# Patient Record
Sex: FEMALE | Race: OTHER | HISPANIC OR LATINO | URBAN - METROPOLITAN AREA
[De-identification: names, ages, dates, MRNs, and addresses within clinical notes are randomized per-mention and may not be internally consistent; named-entity substitution may affect disease eponyms.]

---

## 2017-06-24 ENCOUNTER — EMERGENCY (EMERGENCY)
Facility: HOSPITAL | Age: 62
LOS: 1 days | Discharge: HOME | End: 2017-06-24
Attending: EMERGENCY MEDICINE | Admitting: EMERGENCY MEDICINE
Payer: COMMERCIAL

## 2017-06-24 VITALS
OXYGEN SATURATION: 98 % | SYSTOLIC BLOOD PRESSURE: 131 MMHG | DIASTOLIC BLOOD PRESSURE: 69 MMHG | HEART RATE: 66 BPM | TEMPERATURE: 98 F | RESPIRATION RATE: 18 BRPM

## 2017-06-24 VITALS
RESPIRATION RATE: 18 BRPM | SYSTOLIC BLOOD PRESSURE: 162 MMHG | DIASTOLIC BLOOD PRESSURE: 90 MMHG | TEMPERATURE: 98 F | WEIGHT: 153 LBS | OXYGEN SATURATION: 97 % | HEIGHT: 59 IN | HEART RATE: 81 BPM

## 2017-06-24 DIAGNOSIS — R10.10 UPPER ABDOMINAL PAIN, UNSPECIFIED: ICD-10-CM

## 2017-06-24 DIAGNOSIS — R10.13 EPIGASTRIC PAIN: ICD-10-CM

## 2017-06-24 DIAGNOSIS — E03.9 HYPOTHYROIDISM, UNSPECIFIED: ICD-10-CM

## 2017-06-24 DIAGNOSIS — Z79.899 OTHER LONG TERM (CURRENT) DRUG THERAPY: ICD-10-CM

## 2017-06-24 DIAGNOSIS — J45.909 UNSPECIFIED ASTHMA, UNCOMPLICATED: ICD-10-CM

## 2017-06-24 LAB
ALBUMIN SERPL ELPH-MCNC: 4.5 G/DL — SIGNIFICANT CHANGE UP (ref 3.3–5)
ALP SERPL-CCNC: 90 U/L — SIGNIFICANT CHANGE UP (ref 40–120)
ALT FLD-CCNC: 11 U/L — SIGNIFICANT CHANGE UP (ref 10–45)
ANION GAP SERPL CALC-SCNC: 15 MMOL/L — SIGNIFICANT CHANGE UP (ref 5–17)
AST SERPL-CCNC: 18 U/L — SIGNIFICANT CHANGE UP (ref 10–40)
BASOPHILS NFR BLD AUTO: 0.9 % — SIGNIFICANT CHANGE UP (ref 0–2)
BILIRUB SERPL-MCNC: 0.2 MG/DL — SIGNIFICANT CHANGE UP (ref 0.2–1.2)
BUN SERPL-MCNC: 22 MG/DL — SIGNIFICANT CHANGE UP (ref 7–23)
CALCIUM SERPL-MCNC: 9.6 MG/DL — SIGNIFICANT CHANGE UP (ref 8.4–10.5)
CHLORIDE SERPL-SCNC: 103 MMOL/L — SIGNIFICANT CHANGE UP (ref 96–108)
CO2 SERPL-SCNC: 24 MMOL/L — SIGNIFICANT CHANGE UP (ref 22–31)
CREAT SERPL-MCNC: 0.7 MG/DL — SIGNIFICANT CHANGE UP (ref 0.5–1.3)
EOSINOPHIL NFR BLD AUTO: 2.3 % — SIGNIFICANT CHANGE UP (ref 0–6)
GLUCOSE SERPL-MCNC: 111 MG/DL — HIGH (ref 70–99)
HCT VFR BLD CALC: 38.3 % — SIGNIFICANT CHANGE UP (ref 34.5–45)
HGB BLD-MCNC: 12.9 G/DL — SIGNIFICANT CHANGE UP (ref 11.5–15.5)
LIDOCAIN IGE QN: 60 U/L — SIGNIFICANT CHANGE UP (ref 7–60)
LYMPHOCYTES # BLD AUTO: 32 % — SIGNIFICANT CHANGE UP (ref 13–44)
MCHC RBC-ENTMCNC: 28.5 PG — SIGNIFICANT CHANGE UP (ref 27–34)
MCHC RBC-ENTMCNC: 33.7 G/DL — SIGNIFICANT CHANGE UP (ref 32–36)
MCV RBC AUTO: 84.7 FL — SIGNIFICANT CHANGE UP (ref 80–100)
MONOCYTES NFR BLD AUTO: 6.6 % — SIGNIFICANT CHANGE UP (ref 2–14)
NEUTROPHILS NFR BLD AUTO: 58.2 % — SIGNIFICANT CHANGE UP (ref 43–77)
PLATELET # BLD AUTO: 319 K/UL — SIGNIFICANT CHANGE UP (ref 150–400)
POTASSIUM SERPL-MCNC: 4.1 MMOL/L — SIGNIFICANT CHANGE UP (ref 3.5–5.3)
POTASSIUM SERPL-SCNC: 4.1 MMOL/L — SIGNIFICANT CHANGE UP (ref 3.5–5.3)
PROT SERPL-MCNC: 7.7 G/DL — SIGNIFICANT CHANGE UP (ref 6–8.3)
RBC # BLD: 4.52 M/UL — SIGNIFICANT CHANGE UP (ref 3.8–5.2)
RBC # FLD: 12.9 % — SIGNIFICANT CHANGE UP (ref 10.3–16.9)
SODIUM SERPL-SCNC: 142 MMOL/L — SIGNIFICANT CHANGE UP (ref 135–145)
WBC # BLD: 7.7 K/UL — SIGNIFICANT CHANGE UP (ref 3.8–10.5)
WBC # FLD AUTO: 7.7 K/UL — SIGNIFICANT CHANGE UP (ref 3.8–10.5)

## 2017-06-24 PROCEDURE — 83690 ASSAY OF LIPASE: CPT

## 2017-06-24 PROCEDURE — 93010 ELECTROCARDIOGRAM REPORT: CPT | Mod: NC

## 2017-06-24 PROCEDURE — 99284 EMERGENCY DEPT VISIT MOD MDM: CPT | Mod: 25

## 2017-06-24 PROCEDURE — 93005 ELECTROCARDIOGRAM TRACING: CPT

## 2017-06-24 PROCEDURE — 96375 TX/PRO/DX INJ NEW DRUG ADDON: CPT

## 2017-06-24 PROCEDURE — 80053 COMPREHEN METABOLIC PANEL: CPT

## 2017-06-24 PROCEDURE — 99285 EMERGENCY DEPT VISIT HI MDM: CPT | Mod: 25

## 2017-06-24 PROCEDURE — 85025 COMPLETE CBC W/AUTO DIFF WBC: CPT

## 2017-06-24 PROCEDURE — 96374 THER/PROPH/DIAG INJ IV PUSH: CPT

## 2017-06-24 RX ORDER — SODIUM CHLORIDE 9 MG/ML
3 INJECTION INTRAMUSCULAR; INTRAVENOUS; SUBCUTANEOUS ONCE
Qty: 0 | Refills: 0 | Status: COMPLETED | OUTPATIENT
Start: 2017-06-24 | End: 2017-06-24

## 2017-06-24 RX ORDER — FAMOTIDINE 10 MG/ML
20 INJECTION INTRAVENOUS ONCE
Qty: 0 | Refills: 0 | Status: COMPLETED | OUTPATIENT
Start: 2017-06-24 | End: 2017-06-24

## 2017-06-24 RX ORDER — ONDANSETRON 8 MG/1
4 TABLET, FILM COATED ORAL ONCE
Qty: 0 | Refills: 0 | Status: COMPLETED | OUTPATIENT
Start: 2017-06-24 | End: 2017-06-24

## 2017-06-24 RX ORDER — LIDOCAINE 4 G/100G
10 CREAM TOPICAL ONCE
Qty: 0 | Refills: 0 | Status: COMPLETED | OUTPATIENT
Start: 2017-06-24 | End: 2017-06-24

## 2017-06-24 RX ORDER — FAMOTIDINE 10 MG/ML
1 INJECTION INTRAVENOUS
Qty: 14 | Refills: 0 | OUTPATIENT
Start: 2017-06-24 | End: 2017-07-01

## 2017-06-24 RX ADMIN — SODIUM CHLORIDE 3 MILLILITER(S): 9 INJECTION INTRAMUSCULAR; INTRAVENOUS; SUBCUTANEOUS at 17:55

## 2017-06-24 RX ADMIN — Medication 30 MILLILITER(S): at 17:52

## 2017-06-24 RX ADMIN — ONDANSETRON 4 MILLIGRAM(S): 8 TABLET, FILM COATED ORAL at 17:52

## 2017-06-24 RX ADMIN — FAMOTIDINE 20 MILLIGRAM(S): 10 INJECTION INTRAVENOUS at 17:53

## 2017-06-24 RX ADMIN — LIDOCAINE 10 MILLILITER(S): 4 CREAM TOPICAL at 17:52

## 2017-06-24 NOTE — ED ADULT NURSE NOTE - PMH
Asthma  Asthma  Cholelithiasis  Gallstones  Gastroesophageal reflux disease  GERD (gastroesophageal reflux disease)  Hypothyroidism  Hypothyroid

## 2017-06-24 NOTE — ED PROVIDER NOTE - OBJECTIVE STATEMENT
The pt is a 63 y/o F, who presents to ED c/o upper abd pain since this am. Pt states pain started 30 min after eating breakfast - had scrambled eggs and fries, pain to mid upper abd, 6/10, non radiating, has not taken any meds. no PSH. Denies n/v/d, cp, sob, fevers, chills.

## 2017-06-24 NOTE — ED ADULT NURSE NOTE - CHPI ED SYMPTOMS NEG
no diarrhea/no vomiting/no burning urination/no blood in stool/no hematuria/no fever/no dysuria/no chills/no abdominal distension/no nausea

## 2017-06-24 NOTE — ED ADULT NURSE NOTE - OBJECTIVE STATEMENT
Pt states she had a argument with her  and was feeling very stressed and after she ate she felt worse. Pt states she takes ranitidine for her chronic bronchitis and ulcers.

## 2017-06-24 NOTE — ED PROVIDER NOTE - ATTENDING CONTRIBUTION TO CARE
pt seen and examined by me, agree with above. 63 yo female with upper abd pain for 1 day.  hx of similar in past, is already on acid blocker, sees dr mcgraw GI.  on exam, appears comfortable, heart and lungs normal, abd mildly tender on epigastrum, no rebound or guarding.  feels better after treatment, labs reviewed.  dr mcgraw contacted for fu

## 2017-10-29 ENCOUNTER — INPATIENT (INPATIENT)
Facility: HOSPITAL | Age: 62
LOS: 0 days | Discharge: HOME | DRG: 446 | End: 2017-10-30
Attending: SPECIALIST | Admitting: SPECIALIST
Payer: COMMERCIAL

## 2017-10-29 VITALS
WEIGHT: 110.01 LBS | HEART RATE: 97 BPM | DIASTOLIC BLOOD PRESSURE: 73 MMHG | TEMPERATURE: 98 F | OXYGEN SATURATION: 96 % | SYSTOLIC BLOOD PRESSURE: 158 MMHG | RESPIRATION RATE: 18 BRPM

## 2017-10-29 LAB
ALBUMIN SERPL ELPH-MCNC: 4.6 G/DL — SIGNIFICANT CHANGE UP (ref 3.3–5)
ALP SERPL-CCNC: 94 U/L — SIGNIFICANT CHANGE UP (ref 40–120)
ALT FLD-CCNC: 10 U/L — SIGNIFICANT CHANGE UP (ref 10–45)
ANION GAP SERPL CALC-SCNC: 15 MMOL/L — SIGNIFICANT CHANGE UP (ref 5–17)
APPEARANCE UR: CLEAR — SIGNIFICANT CHANGE UP
APTT BLD: 31.1 SEC — SIGNIFICANT CHANGE UP (ref 27.5–37.4)
AST SERPL-CCNC: 16 U/L — SIGNIFICANT CHANGE UP (ref 10–40)
BASOPHILS NFR BLD AUTO: 1 % — SIGNIFICANT CHANGE UP (ref 0–2)
BILIRUB SERPL-MCNC: 0.2 MG/DL — SIGNIFICANT CHANGE UP (ref 0.2–1.2)
BILIRUB UR-MCNC: NEGATIVE — SIGNIFICANT CHANGE UP
BUN SERPL-MCNC: 14 MG/DL — SIGNIFICANT CHANGE UP (ref 7–23)
CALCIUM SERPL-MCNC: 9.8 MG/DL — SIGNIFICANT CHANGE UP (ref 8.4–10.5)
CHLORIDE SERPL-SCNC: 99 MMOL/L — SIGNIFICANT CHANGE UP (ref 96–108)
CO2 SERPL-SCNC: 24 MMOL/L — SIGNIFICANT CHANGE UP (ref 22–31)
COLOR SPEC: YELLOW — SIGNIFICANT CHANGE UP
CREAT SERPL-MCNC: 0.73 MG/DL — SIGNIFICANT CHANGE UP (ref 0.5–1.3)
DIFF PNL FLD: NEGATIVE — SIGNIFICANT CHANGE UP
EOSINOPHIL NFR BLD AUTO: 3.1 % — SIGNIFICANT CHANGE UP (ref 0–6)
GLUCOSE SERPL-MCNC: 117 MG/DL — HIGH (ref 70–99)
GLUCOSE UR QL: NEGATIVE — SIGNIFICANT CHANGE UP
HCT VFR BLD CALC: 35.1 % — SIGNIFICANT CHANGE UP (ref 34.5–45)
HGB BLD-MCNC: 12.3 G/DL — SIGNIFICANT CHANGE UP (ref 11.5–15.5)
INR BLD: 0.97 — SIGNIFICANT CHANGE UP (ref 0.88–1.16)
KETONES UR-MCNC: NEGATIVE — SIGNIFICANT CHANGE UP
LEUKOCYTE ESTERASE UR-ACNC: NEGATIVE — SIGNIFICANT CHANGE UP
LIDOCAIN IGE QN: 224 U/L — HIGH (ref 7–60)
LYMPHOCYTES # BLD AUTO: 27.9 % — SIGNIFICANT CHANGE UP (ref 13–44)
MCHC RBC-ENTMCNC: 29.6 PG — SIGNIFICANT CHANGE UP (ref 27–34)
MCHC RBC-ENTMCNC: 35 G/DL — SIGNIFICANT CHANGE UP (ref 32–36)
MCV RBC AUTO: 84.4 FL — SIGNIFICANT CHANGE UP (ref 80–100)
MONOCYTES NFR BLD AUTO: 8.4 % — SIGNIFICANT CHANGE UP (ref 2–14)
NEUTROPHILS NFR BLD AUTO: 59.6 % — SIGNIFICANT CHANGE UP (ref 43–77)
NITRITE UR-MCNC: NEGATIVE — SIGNIFICANT CHANGE UP
PH UR: 6.5 — SIGNIFICANT CHANGE UP (ref 5–8)
PLATELET # BLD AUTO: 343 K/UL — SIGNIFICANT CHANGE UP (ref 150–400)
POTASSIUM SERPL-MCNC: 3.8 MMOL/L — SIGNIFICANT CHANGE UP (ref 3.5–5.3)
POTASSIUM SERPL-SCNC: 3.8 MMOL/L — SIGNIFICANT CHANGE UP (ref 3.5–5.3)
PROT SERPL-MCNC: 7.8 G/DL — SIGNIFICANT CHANGE UP (ref 6–8.3)
PROT UR-MCNC: NEGATIVE MG/DL — SIGNIFICANT CHANGE UP
PROTHROM AB SERPL-ACNC: 10.8 SEC — SIGNIFICANT CHANGE UP (ref 9.8–12.7)
RBC # BLD: 4.16 M/UL — SIGNIFICANT CHANGE UP (ref 3.8–5.2)
RBC # FLD: 12.9 % — SIGNIFICANT CHANGE UP (ref 10.3–16.9)
SODIUM SERPL-SCNC: 138 MMOL/L — SIGNIFICANT CHANGE UP (ref 135–145)
SP GR SPEC: <=1.005 — SIGNIFICANT CHANGE UP (ref 1–1.03)
UROBILINOGEN FLD QL: 0.2 E.U./DL — SIGNIFICANT CHANGE UP
WBC # BLD: 7.8 K/UL — SIGNIFICANT CHANGE UP (ref 3.8–10.5)
WBC # FLD AUTO: 7.8 K/UL — SIGNIFICANT CHANGE UP (ref 3.8–10.5)

## 2017-10-29 PROCEDURE — 74176 CT ABD & PELVIS W/O CONTRAST: CPT | Mod: 26,59

## 2017-10-29 PROCEDURE — 74177 CT ABD & PELVIS W/CONTRAST: CPT | Mod: 26

## 2017-10-29 PROCEDURE — 93010 ELECTROCARDIOGRAM REPORT: CPT

## 2017-10-29 PROCEDURE — 99285 EMERGENCY DEPT VISIT HI MDM: CPT | Mod: 25

## 2017-10-29 RX ORDER — ONDANSETRON 8 MG/1
4 TABLET, FILM COATED ORAL ONCE
Qty: 0 | Refills: 0 | Status: COMPLETED | OUTPATIENT
Start: 2017-10-29 | End: 2017-10-29

## 2017-10-29 RX ORDER — FAMOTIDINE 10 MG/ML
20 INJECTION INTRAVENOUS ONCE
Qty: 0 | Refills: 0 | Status: COMPLETED | OUTPATIENT
Start: 2017-10-29 | End: 2017-10-29

## 2017-10-29 RX ORDER — MORPHINE SULFATE 50 MG/1
4 CAPSULE, EXTENDED RELEASE ORAL ONCE
Qty: 0 | Refills: 0 | Status: DISCONTINUED | OUTPATIENT
Start: 2017-10-29 | End: 2017-10-29

## 2017-10-29 RX ORDER — SODIUM CHLORIDE 9 MG/ML
1000 INJECTION INTRAMUSCULAR; INTRAVENOUS; SUBCUTANEOUS ONCE
Qty: 0 | Refills: 0 | Status: COMPLETED | OUTPATIENT
Start: 2017-10-29 | End: 2017-10-29

## 2017-10-29 RX ORDER — IOHEXOL 300 MG/ML
50 INJECTION, SOLUTION INTRAVENOUS ONCE
Qty: 0 | Refills: 0 | Status: COMPLETED | OUTPATIENT
Start: 2017-10-29 | End: 2017-10-29

## 2017-10-29 RX ORDER — SODIUM CHLORIDE 9 MG/ML
3 INJECTION INTRAMUSCULAR; INTRAVENOUS; SUBCUTANEOUS ONCE
Qty: 0 | Refills: 0 | Status: COMPLETED | OUTPATIENT
Start: 2017-10-29 | End: 2017-10-29

## 2017-10-29 RX ADMIN — MORPHINE SULFATE 4 MILLIGRAM(S): 50 CAPSULE, EXTENDED RELEASE ORAL at 16:07

## 2017-10-29 RX ADMIN — MORPHINE SULFATE 4 MILLIGRAM(S): 50 CAPSULE, EXTENDED RELEASE ORAL at 21:06

## 2017-10-29 RX ADMIN — SODIUM CHLORIDE 1000 MILLILITER(S): 9 INJECTION INTRAMUSCULAR; INTRAVENOUS; SUBCUTANEOUS at 16:35

## 2017-10-29 RX ADMIN — ONDANSETRON 4 MILLIGRAM(S): 8 TABLET, FILM COATED ORAL at 19:13

## 2017-10-29 RX ADMIN — SODIUM CHLORIDE 3 MILLILITER(S): 9 INJECTION INTRAMUSCULAR; INTRAVENOUS; SUBCUTANEOUS at 15:23

## 2017-10-29 RX ADMIN — IOHEXOL 50 MILLILITER(S): 300 INJECTION, SOLUTION INTRAVENOUS at 16:35

## 2017-10-29 RX ADMIN — MORPHINE SULFATE 4 MILLIGRAM(S): 50 CAPSULE, EXTENDED RELEASE ORAL at 16:22

## 2017-10-29 RX ADMIN — MORPHINE SULFATE 4 MILLIGRAM(S): 50 CAPSULE, EXTENDED RELEASE ORAL at 21:22

## 2017-10-29 RX ADMIN — FAMOTIDINE 20 MILLIGRAM(S): 10 INJECTION INTRAVENOUS at 15:28

## 2017-10-29 RX ADMIN — MORPHINE SULFATE 4 MILLIGRAM(S): 50 CAPSULE, EXTENDED RELEASE ORAL at 15:28

## 2017-10-29 NOTE — CONSULT NOTE ADULT - ASSESSMENT
62F with epigastric pain most likely 2/2 PUD however symptomatic cholelithiasis remains on the differential.    - Repeat CT scan showing normal appendix, and no concern for perforated viscus.  - No emergent surgical intervention at this point in time.  - Would appreciate Dr. Tinajero's input for admission and repeat EGD for possible recurrent PUD.  - Would recommend outpt follow up with Dr. Zaid Choudhary for discussion of elective laparoscopic cholecystectomy.  - Please reconsult for further questions.  - Discussed with Chief on Call and Dr. Zaid Choudhary.

## 2017-10-29 NOTE — ED PROVIDER NOTE - PROGRESS NOTE DETAILS
Pt with continued epigastric abdominal pain. CT without demonstration of pancreatitis despite slightly elevated lipase. Pt with ? finding to proximal appendix, Surgery consulted, repeat CT performed and not felt to have acute process present to appendix which correlates with patient's clinical presentation. Pt continues to require IV morphine for pain, likely PUD as etiology, discussed with Dr. Tinajero, and will admit for continued management.

## 2017-10-29 NOTE — ED PROVIDER NOTE - MEDICAL DECISION MAKING DETAILS
61 yo female with hx of gerd, gallstones with upper abd pain.  will get labs, may need sono 63 yo female with hx of gerd, gallstones with upper abd pain.  will get labs, may need sono.  lipase was elevated ct ordered.  ct showed ?early appendicitis.  dw Dr Tinajero, pt's GI doctor, request Dr cee for surgery consult

## 2017-10-29 NOTE — CONSULT NOTE ADULT - SUBJECTIVE AND OBJECTIVE BOX
HPI: 62F with a hx of PUD and cholelithiasis presenting to the [facility] ED with an episode of epigastric pain described as a "pulling sensation" after eating a fatty meal this evening.  She reports that the pain is similar to other episodes in the past.  It has been associated with N/V, once prior to arrival to the ED and was NBNB, and once after drinking the oral contrast for the CT which was also NBNB.  She denies F/C/CP/SOB/Diarrhea/Dysuria. Of note she has been followed by Dr. Tinajero for her PUD/GERD in the past.  Last EGD was over a year ago and according to the pt showed an ulcer.  She thinks that she was treated for H pylori in the past but doesn't remember.  She currently takes omeprazole daily, PRN ranitadine, and sucralfate.  Last colonoscopy was a year ago and reportedly normal. Denies Hematochezia.  Of note the daughter reports that her appetite has been diminished for the last year with concurrent weight loss of around 50 lbs. Denies NSAID use.    PMH: PUD, GERD, Cholelithiasis, Hypothyroid  Meds: Omeprazole, Ranitadine, Sucralfate, Synthroid  NKDA  Social: Denies ETOH use or smoking hx          Vital Signs Last 24 Hrs  T(C): 36.5 (29 Oct 2017 23:25), Max: 37.5 (29 Oct 2017 16:02)  T(F): 97.7 (29 Oct 2017 23:25), Max: 99.5 (29 Oct 2017 16:02)  HR: 66 (29 Oct 2017 23:25) (66 - 97)  BP: 144/67 (29 Oct 2017 23:25) (144/67 - 158/73)  BP(mean): --  RR: 18 (29 Oct 2017 23:25) (18 - 18)  SpO2: 97% (29 Oct 2017 23:25) (96% - 99%)    PHYSICAL EXAM:  Gen: Age appropriate female in mild distress 2/2 epigastric discomfort  CV: RRR  Resp: CTAB  Abd: normoactive BS, soft, tender to palpation in the epigastrium, no ttp below the umbilicus. Nondistended  Ext: WWP, no peripheral edema      LABS:                        12.3   7.8   )-----------( 343      ( 29 Oct 2017 15:24 )             35.1     10-29    138  |  99  |  14  ----------------------------<  117<H>  3.8   |  24  |  0.73    Ca    9.8      29 Oct 2017 15:24    TPro  7.8  /  Alb  4.6  /  TBili  0.2  /  DBili  x   /  AST  16  /  ALT  10  /  AlkPhos  94  10-29    PT/INR - ( 29 Oct 2017 15:24 )   PT: 10.8 sec;   INR: 0.97          PTT - ( 29 Oct 2017 15:24 )  PTT:31.1 sec  Urinalysis Basic - ( 29 Oct 2017 21:04 )    Color: Yellow / Appearance: Clear / SG: <=1.005 / pH: x  Gluc: x / Ketone: NEGATIVE  / Bili: Negative / Urobili: 0.2 E.U./dL   Blood: x / Protein: NEGATIVE mg/dL / Nitrite: NEGATIVE   Leuk Esterase: NEGATIVE / RBC: x / WBC x   Sq Epi: x / Non Sq Epi: x / Bacteria: x        RADIOLOGY & ADDITIONAL STUDIES:  1st CT:  1. Thickening involving the origin and proximal segment of the appendix   which measures up to 10 mm, the mid and distal segments of the appendix   are normal in caliber and filled with gas. No periappendiceal fat   stranding or free fluid. Cannot entirely exclude an early evolving   appendicitis and further correlation with the patient's clinical symptoms   are recommended. Cholelithiasis with no evidence of cholecystitis.  3. No evidence of pancreatitis.    2nd CT:  No significant change in mild thickening along the proximal appendix just   distal to its origin off the base of the cecum, however there is no   surrounding fat infiltration or free fluid to suggest active inflammatory   change. The mid and distal segments of the appendix are normal in caliber   and filled with gas. These findings indicate a normal appendix.  Cholelithiasis.

## 2017-10-29 NOTE — ED ADULT NURSE NOTE - OBJECTIVE STATEMENT
patient states that she ate an omelette and home fries this morning from a restaurant and then started to have epigastric pain and tenderness. patient states "I think it may be my ulcer". denies chest pain, fever, chills, n/v.

## 2017-10-30 ENCOUNTER — TRANSCRIPTION ENCOUNTER (OUTPATIENT)
Age: 62
End: 2017-10-30

## 2017-10-30 VITALS
DIASTOLIC BLOOD PRESSURE: 78 MMHG | HEART RATE: 62 BPM | RESPIRATION RATE: 18 BRPM | SYSTOLIC BLOOD PRESSURE: 138 MMHG | TEMPERATURE: 99 F | OXYGEN SATURATION: 97 %

## 2017-10-30 DIAGNOSIS — E03.9 HYPOTHYROIDISM, UNSPECIFIED: ICD-10-CM

## 2017-10-30 DIAGNOSIS — R10.13 EPIGASTRIC PAIN: ICD-10-CM

## 2017-10-30 DIAGNOSIS — R63.8 OTHER SYMPTOMS AND SIGNS CONCERNING FOOD AND FLUID INTAKE: ICD-10-CM

## 2017-10-30 DIAGNOSIS — J45.909 UNSPECIFIED ASTHMA, UNCOMPLICATED: ICD-10-CM

## 2017-10-30 DIAGNOSIS — Z29.9 ENCOUNTER FOR PROPHYLACTIC MEASURES, UNSPECIFIED: ICD-10-CM

## 2017-10-30 DIAGNOSIS — R74.8 ABNORMAL LEVELS OF OTHER SERUM ENZYMES: ICD-10-CM

## 2017-10-30 LAB
ALBUMIN SERPL ELPH-MCNC: 4.5 G/DL — SIGNIFICANT CHANGE UP (ref 3.3–5)
ALP SERPL-CCNC: 100 U/L — SIGNIFICANT CHANGE UP (ref 40–120)
ALT FLD-CCNC: 9 U/L — LOW (ref 10–45)
ANION GAP SERPL CALC-SCNC: 16 MMOL/L — SIGNIFICANT CHANGE UP (ref 5–17)
AST SERPL-CCNC: 14 U/L — SIGNIFICANT CHANGE UP (ref 10–40)
BASOPHILS NFR BLD AUTO: 0.4 % — SIGNIFICANT CHANGE UP (ref 0–2)
BILIRUB SERPL-MCNC: 0.4 MG/DL — SIGNIFICANT CHANGE UP (ref 0.2–1.2)
BLD GP AB SCN SERPL QL: NEGATIVE — SIGNIFICANT CHANGE UP
BUN SERPL-MCNC: 17 MG/DL — SIGNIFICANT CHANGE UP (ref 7–23)
CALCIUM SERPL-MCNC: 9.5 MG/DL — SIGNIFICANT CHANGE UP (ref 8.4–10.5)
CHLORIDE SERPL-SCNC: 99 MMOL/L — SIGNIFICANT CHANGE UP (ref 96–108)
CO2 SERPL-SCNC: 23 MMOL/L — SIGNIFICANT CHANGE UP (ref 22–31)
CREAT SERPL-MCNC: 0.62 MG/DL — SIGNIFICANT CHANGE UP (ref 0.5–1.3)
GLUCOSE SERPL-MCNC: 134 MG/DL — HIGH (ref 70–99)
HBA1C BLD-MCNC: 5.9 % — HIGH (ref 4–5.6)
HCT VFR BLD CALC: 34.3 % — LOW (ref 34.5–45)
HGB BLD-MCNC: 11.5 G/DL — SIGNIFICANT CHANGE UP (ref 11.5–15.5)
INR BLD: 1.04 — SIGNIFICANT CHANGE UP (ref 0.88–1.16)
LYMPHOCYTES # BLD AUTO: 16.1 % — SIGNIFICANT CHANGE UP (ref 13–44)
MCHC RBC-ENTMCNC: 28.7 PG — SIGNIFICANT CHANGE UP (ref 27–34)
MCHC RBC-ENTMCNC: 33.5 G/DL — SIGNIFICANT CHANGE UP (ref 32–36)
MCV RBC AUTO: 85.5 FL — SIGNIFICANT CHANGE UP (ref 80–100)
MONOCYTES NFR BLD AUTO: 5.4 % — SIGNIFICANT CHANGE UP (ref 2–14)
NEUTROPHILS NFR BLD AUTO: 78.1 % — HIGH (ref 43–77)
PLATELET # BLD AUTO: 326 K/UL — SIGNIFICANT CHANGE UP (ref 150–400)
POTASSIUM SERPL-MCNC: 3.9 MMOL/L — SIGNIFICANT CHANGE UP (ref 3.5–5.3)
POTASSIUM SERPL-SCNC: 3.9 MMOL/L — SIGNIFICANT CHANGE UP (ref 3.5–5.3)
PROT SERPL-MCNC: 7.6 G/DL — SIGNIFICANT CHANGE UP (ref 6–8.3)
PROTHROM AB SERPL-ACNC: 11.5 SEC — SIGNIFICANT CHANGE UP (ref 9.8–12.7)
RBC # BLD: 4.01 M/UL — SIGNIFICANT CHANGE UP (ref 3.8–5.2)
RBC # FLD: 12.6 % — SIGNIFICANT CHANGE UP (ref 10.3–16.9)
RH IG SCN BLD-IMP: POSITIVE — SIGNIFICANT CHANGE UP
SODIUM SERPL-SCNC: 138 MMOL/L — SIGNIFICANT CHANGE UP (ref 135–145)
TSH SERPL-MCNC: 0.22 UIU/ML — LOW (ref 0.35–4.94)
WBC # BLD: 7.7 K/UL — SIGNIFICANT CHANGE UP (ref 3.8–10.5)
WBC # FLD AUTO: 7.7 K/UL — SIGNIFICANT CHANGE UP (ref 3.8–10.5)

## 2017-10-30 PROCEDURE — 81003 URINALYSIS AUTO W/O SCOPE: CPT

## 2017-10-30 PROCEDURE — 86900 BLOOD TYPING SEROLOGIC ABO: CPT

## 2017-10-30 PROCEDURE — 36415 COLL VENOUS BLD VENIPUNCTURE: CPT

## 2017-10-30 PROCEDURE — 85730 THROMBOPLASTIN TIME PARTIAL: CPT

## 2017-10-30 PROCEDURE — 93010 ELECTROCARDIOGRAM REPORT: CPT

## 2017-10-30 PROCEDURE — 93005 ELECTROCARDIOGRAM TRACING: CPT

## 2017-10-30 PROCEDURE — 74176 CT ABD & PELVIS W/O CONTRAST: CPT

## 2017-10-30 PROCEDURE — 74177 CT ABD & PELVIS W/CONTRAST: CPT

## 2017-10-30 PROCEDURE — 85610 PROTHROMBIN TIME: CPT

## 2017-10-30 PROCEDURE — 83036 HEMOGLOBIN GLYCOSYLATED A1C: CPT

## 2017-10-30 PROCEDURE — 99285 EMERGENCY DEPT VISIT HI MDM: CPT | Mod: 25

## 2017-10-30 PROCEDURE — 83690 ASSAY OF LIPASE: CPT

## 2017-10-30 PROCEDURE — 96374 THER/PROPH/DIAG INJ IV PUSH: CPT | Mod: XU

## 2017-10-30 PROCEDURE — 85025 COMPLETE CBC W/AUTO DIFF WBC: CPT

## 2017-10-30 PROCEDURE — 86850 RBC ANTIBODY SCREEN: CPT

## 2017-10-30 PROCEDURE — 86901 BLOOD TYPING SEROLOGIC RH(D): CPT

## 2017-10-30 PROCEDURE — 84443 ASSAY THYROID STIM HORMONE: CPT

## 2017-10-30 PROCEDURE — 96375 TX/PRO/DX INJ NEW DRUG ADDON: CPT

## 2017-10-30 PROCEDURE — 80053 COMPREHEN METABOLIC PANEL: CPT

## 2017-10-30 PROCEDURE — 96376 TX/PRO/DX INJ SAME DRUG ADON: CPT

## 2017-10-30 RX ORDER — LEVOTHYROXINE SODIUM 125 MCG
100 TABLET ORAL DAILY
Qty: 0 | Refills: 0 | Status: DISCONTINUED | OUTPATIENT
Start: 2017-10-30 | End: 2017-10-30

## 2017-10-30 RX ORDER — LEVOTHYROXINE SODIUM 125 MCG
0 TABLET ORAL
Qty: 0 | Refills: 0 | COMMUNITY

## 2017-10-30 RX ORDER — IPRATROPIUM/ALBUTEROL SULFATE 18-103MCG
3 AEROSOL WITH ADAPTER (GRAM) INHALATION EVERY 6 HOURS
Qty: 0 | Refills: 0 | Status: DISCONTINUED | OUTPATIENT
Start: 2017-10-30 | End: 2017-10-30

## 2017-10-30 RX ORDER — INFLUENZA VIRUS VACCINE 15; 15; 15; 15 UG/.5ML; UG/.5ML; UG/.5ML; UG/.5ML
0.5 SUSPENSION INTRAMUSCULAR ONCE
Qty: 0 | Refills: 0 | Status: DISCONTINUED | OUTPATIENT
Start: 2017-10-30 | End: 2017-10-30

## 2017-10-30 RX ORDER — PANTOPRAZOLE SODIUM 20 MG/1
40 TABLET, DELAYED RELEASE ORAL DAILY
Qty: 0 | Refills: 0 | Status: DISCONTINUED | OUTPATIENT
Start: 2017-10-30 | End: 2017-10-30

## 2017-10-30 RX ORDER — HEPARIN SODIUM 5000 [USP'U]/ML
5000 INJECTION INTRAVENOUS; SUBCUTANEOUS EVERY 8 HOURS
Qty: 0 | Refills: 0 | Status: DISCONTINUED | OUTPATIENT
Start: 2017-10-30 | End: 2017-10-30

## 2017-10-30 RX ORDER — PANTOPRAZOLE SODIUM 20 MG/1
40 TABLET, DELAYED RELEASE ORAL ONCE
Qty: 0 | Refills: 0 | Status: COMPLETED | OUTPATIENT
Start: 2017-10-30 | End: 2017-10-30

## 2017-10-30 RX ORDER — LEVOTHYROXINE SODIUM 125 MCG
1 TABLET ORAL
Qty: 0 | Refills: 0 | COMMUNITY

## 2017-10-30 RX ORDER — RANITIDINE HYDROCHLORIDE 150 MG/1
1 TABLET, FILM COATED ORAL
Qty: 0 | Refills: 0 | COMMUNITY

## 2017-10-30 RX ORDER — SODIUM CHLORIDE 9 MG/ML
1000 INJECTION INTRAMUSCULAR; INTRAVENOUS; SUBCUTANEOUS
Qty: 0 | Refills: 0 | Status: DISCONTINUED | OUTPATIENT
Start: 2017-10-30 | End: 2017-10-30

## 2017-10-30 RX ADMIN — SODIUM CHLORIDE 75 MILLILITER(S): 9 INJECTION INTRAMUSCULAR; INTRAVENOUS; SUBCUTANEOUS at 01:21

## 2017-10-30 RX ADMIN — HEPARIN SODIUM 5000 UNIT(S): 5000 INJECTION INTRAVENOUS; SUBCUTANEOUS at 13:18

## 2017-10-30 RX ADMIN — Medication 100 MICROGRAM(S): at 05:24

## 2017-10-30 RX ADMIN — PANTOPRAZOLE SODIUM 40 MILLIGRAM(S): 20 TABLET, DELAYED RELEASE ORAL at 01:21

## 2017-10-30 RX ADMIN — PANTOPRAZOLE SODIUM 40 MILLIGRAM(S): 20 TABLET, DELAYED RELEASE ORAL at 13:18

## 2017-10-30 NOTE — H&P ADULT - NSHPPHYSICALEXAM_GEN_ALL_CORE
Vital Signs Last 24 Hrs  T(C): 36.5 (29 Oct 2017 23:25), Max: 37.5 (29 Oct 2017 16:02)  T(F): 97.7 (29 Oct 2017 23:25), Max: 99.5 (29 Oct 2017 16:02)  HR: 66 (29 Oct 2017 23:25) (66 - 97)  BP: 144/67 (29 Oct 2017 23:25) (144/67 - 158/73)  BP(mean): --  RR: 18 (29 Oct 2017 23:25) (18 - 18)  SpO2: 97% (29 Oct 2017 23:25) (96% - 99%)    General: Well Appearing, Non-Toxic, NAD  HEENT: NCAT, PERRLA B/L, EOMI B/L, Dry MM, No Scleral Icterus   Neck: Supple, No Cervical or Supraclavicular Lymphadenopathy  Heart: Normal S1+S2, RRR, No M/R/G  Lungs: CTA B/L, No W/R/R  Abdomen: Soft, Mildly Distended, Non-Tender, Normoactive Bowel Sounds, No Guarding, No Rebound Tenderness, No Rigidity  Back: No Spinal or Paraspinal Tenderness, No CVA Tenderness  Extremities: Warm, Well Perfused, 2+ Radial and DP Pulses Bilaterally  Neurological: AAOx3, No Focal Motor or Sensory Deficits   Skin: Warm, Dry, No Jaundice

## 2017-10-30 NOTE — PROGRESS NOTE ADULT - SUBJECTIVE AND OBJECTIVE BOX
Patient is a 62 year old female with past medical history of gastritis, Hypothyroidism (S/P Thyroidectomy for "Overactive Thyroid" on Levothyroxine), Asthma (Well Controlled) presenting with Acute Onset Epigastric Pain despite Rxs unable to control.    Patient reports Epigastric Pain Started on Monday, 10/23. Patient reports Epigastric Pain was Slight, and Did Not Bother Her Much. On 10/29, Patient reports Getting Up in AM, Feeling Well, No Pain. However, at Work was Eating Breakfast and Drinking Coffee and Developed Acute Epigastric Pain While Eating. Patient reports Pain 10/10 in Severity, Described as "Sharp", and "Twisting" Sensation, Non-Radiating. Patient reports "Like Giving Birth". Patient denies Back Pain. Patient denies Associated Nausea, Vomiting, Diarrhea, Constipation, and reports Passing Flatus. Patient denies Melena, Hematochezia, Pain denies Weight Loss or Night Sweats. Patient denies Chest Pain, SOB, Palpitations, Dizziness/Lightheadedness, or Diaphoresis. Patient denies Dysuria, Hematuria, Frequency, Flank Pain. Patient denies Pain/Swelling of Lower Extremities, Rash, Arthalgias or Myalgais.         In the ED, Vitals were TMax 99.5, HR 66-97, -158/67-73, RR 18, SpO2 96-99% on RA. Labs Notable for Elevated Lipase of 224. CT Abdomen Pelvis with PO and IV Contrast demonstrating "thickening involving the origin and proximal segment of the appendix which measures up to 10 mm and distal segments of the appendix are normal in caliber and  filled with gas" and as per Radiology Read "cannot entirely exclude an early evolving appendicitis". Surgery Consulted, and CT Repeated without Contrast, which was read as "no significant change in mild thickening along the proximal appendix just distal to its origin off the base of the cecum" "there is no surrounding fat infiltration or free fluid to suggest active inflammatory change". Surgery determined No Surgical Intervention. Patient given 1L NS, Zofran 4mg IV x 1, Morphine 4mg IV x 3, Pepcid 20mg IV x 1, and Pantoprazole 40mg IV x 1. Admission for Pain Control and Possible EGD in AM. (30 Oct 2017 00:55)      REVIEW OF SYSTEMS:  Constitutional: No fever, weight loss or fatigue  ENMT:  No difficulty hearing, tinnitus, vertigo; No sinus or throat pain  Respiratory: No cough, wheezing, chills or hemoptysis  Cardiovascular: No chest pain, palpitations, dizziness or leg swelling  Gastrointestinal: No abdominal or epigastric pain. No nausea, vomiting or hematemesis; No diarrhea or constipation. No melena or hematochezia.  Skin: No itching, burning, rashes or lesions   Musculoskeletal: No joint pain or swelling; No muscle, back or extremity pain    PAST MEDICAL & SURGICAL HISTORY:  Cholelithiasis: Gallstones  Gastroesophageal reflux disease: GERD (gastroesophageal reflux disease)  Asthma: Asthma  Hypothyroidism: Hypothyroid  Other postprocedural status: S/P thyroidectomy      FAMILY HISTORY:  No pertinent family history in first degree relatives      SOCIAL HISTORY:  Smoking Status: [ ] Current, [ ] Former, [ ] Never  Pack Years:    MEDICATIONS:  MEDICATIONS  (STANDING):  heparin  Injectable 5000 Unit(s) SubCutaneous every 8 hours  influenza   Vaccine 0.5 milliLiter(s) IntraMuscular once  levothyroxine 100 MICROGram(s) Oral daily  pantoprazole  Injectable 40 milliGRAM(s) IV Push daily  sodium chloride 0.9%. 1000 milliLiter(s) (75 mL/Hr) IV Continuous <Continuous>    MEDICATIONS  (PRN):  ALBUTerol/ipratropium for Nebulization 3 milliLiter(s) Nebulizer every 6 hours PRN Shortness of Breath and/or Wheezing      Allergies    No Known Allergies    Intolerances        Vital Signs Last 24 Hrs  T(C): 37.1 (30 Oct 2017 08:48), Max: 37.5 (29 Oct 2017 16:02)  T(F): 98.7 (30 Oct 2017 08:48), Max: 99.5 (29 Oct 2017 16:02)  HR: 62 (30 Oct 2017 08:48) (61 - 97)  BP: 138/78 (30 Oct 2017 08:48) (101/56 - 160/68)  BP(mean): --  RR: 18 (30 Oct 2017 08:48) (18 - 18)  SpO2: 97% (30 Oct 2017 08:48) (96% - 99%)        PHYSICAL EXAM:    General: Well developed; well nourished; in no acute distress  HEENT: MMM, conjunctiva and sclera clear  Lungs: clear  Heart: regular  Gastrointestinal: Soft, non-tender non-distended; Normal bowel sounds; No rebound or guarding  Extremities: Normal range of motion, No clubbing, cyanosis or edema  Neurological: Alert and oriented x3  Skin: Warm and dry. No obvious rash      LABS:                        11.5   7.7   )-----------( 326      ( 30 Oct 2017 05:41 )             34.3     10-30    138  |  99  |  17  ----------------------------<  134<H>  3.9   |  23  |  0.62    Ca    9.5      30 Oct 2017 05:42    TPro  7.6  /  Alb  4.5  /  TBili  0.4  /  DBili  x   /  AST  14  /  ALT  9<L>  /  AlkPhos  100  10-30          RADIOLOGY & ADDITIONAL STUDIES:

## 2017-10-30 NOTE — DISCHARGE NOTE ADULT - CARE PLAN
Principal Discharge DX:	Acute abdominal pain  Goal:	To identify and treat the source of the pain  Instructions for follow-up, activity and diet:	You were admitted for extreme abdominal pain. You were initially scheduled to have an endoscopy to treat a presumed peptic ulcer, but given findings on radiology and the history you shared, it appears that your pain may be due to gall bladder pain stimulated by fatty foods. Please follow up with your primary care doctor Dr. Tinajero within one week to determine your future course of care, including the need for endoscopy. You should follow up as well with surgery regarding suggested removal of your gall bladder.  Secondary Diagnosis:	Hypothyroidism  Goal:	To have normal thyroid levels  Instructions for follow-up, activity and diet:	You have a history of low thyroid levels following a surgery to remove your thyroid. During this hospital visit, your thyroid levels were found to be too high due to a high dose of thyroid replacement. Please do not take your thyroid supplement for the next three days until you visit your primary care doctor, who will restart you on a lower dose of the medicine.

## 2017-10-30 NOTE — H&P ADULT - PROBLEM SELECTOR PLAN 1
Patient reports Epigastric Pain, which has been Present Since Monday, 10/23/17, which was Slightly and Bearable, However, reports in AM on 10/29/17 Epigastric Pain Worsened with Food. In ED, CT Abdomen/Pelvis was Concerning for Possible Appendicitis, however, Surgery Consulted, and Imaging Repeated, and Together with History and Examination Appendicitis is Unlikely. Lipase Mildly Elevated, However Not >3 Times Upper Limit of Normal and No CT Evidence. Likely Peptic Ulcer Disease. Patient reporting Pain Improved.   -Started on Pantoprazole 40mg IV Q daily.   -Will Keep NPO for Now, Possible EGD in AM.   -Started on Normal Saline at 75mL/hour.   -Will Give Morphine 2mg IV Q6 Hours PRN Severe Pain.

## 2017-10-30 NOTE — DISCHARGE NOTE ADULT - MEDICATION SUMMARY - MEDICATIONS TO TAKE
I will START or STAY ON the medications listed below when I get home from the hospital:    albuterol 90 mcg/inh inhalation aerosol  -- 2 puff(s) inhaled 4 times a day, As Needed  -- Indication: For Asthma

## 2017-10-30 NOTE — H&P ADULT - NSHPREVIEWOFSYSTEMS_GEN_ALL_CORE
1. Thickening involving the origin and proximal segment of the appendix   which measures up to 10 mm, the mid and distal segments of the appendix   are normal in caliber and filled with gas. No periappendiceal fat   stranding or free fluid. Cannot entirely exclude an early evolving   appendicitis and further correlation with the patient's clinical symptoms   are recommended. Cholelithiasis with no evidence of cholecystitis.  3. No evidence of pancreatitis. As per HPI

## 2017-10-30 NOTE — DISCHARGE NOTE ADULT - CARE PROVIDER_API CALL
Brendan Tinajero), Medicine  [address] [facility] [address]  [place] [address]  Phone: [phone]  Fax: [phone]

## 2017-10-30 NOTE — H&P ADULT - HISTORY OF PRESENT ILLNESS
Patient is a 62 year old female with past medical history of Hypothyroidism (S/P Thyroidectomy for "Overactive Thyroid" on Levothyroxine), Asthma (Well Controlled) presenting with Acute Onset Epigastric Pain.       In the ED, Vitals were TMax 99.5, HR 66-97, -158/67-73, RR 18, SpO2 96-99% on RA. Labs Notable for Elevated Lipase of 224. CT Abdomen Pelvis with PO and IV Contrast demonstrating "thickening involving the origin and proximal segment of the appendix Patient is a 62 year old female with past medical history of Possible Peptic Ulcer Disease, Hypothyroidism (S/P Thyroidectomy for "Overactive Thyroid" on Levothyroxine), Asthma (Well Controlled) presenting with Acute Onset Epigastric Pain.       In the ED, Vitals were TMax 99.5, HR 66-97, -158/67-73, RR 18, SpO2 96-99% on RA. Labs Notable for Elevated Lipase of 224. CT Abdomen Pelvis with PO and IV Contrast demonstrating "thickening involving the origin and proximal segment of the appendix which measures up to 10 mm and distal segments of the appendix are normal in caliber and  filled with gas" and as per Radiology Read "cannot entirely exclude an early evolving appendicitis". Surgery Consulted, and CT Repeated without Contrast, which was read as "no significant change in mild thickening along the proximal appendix just distal to its origin off the base of the cecum" "there is no surrounding fat infiltration or free fluid to suggest active inflammatory change". Surgery determined No Surgical Intervention. Patient given 1L NS, Zofran 4mg IV x 1, Morphine 4mg IV x 3, Pepcid 20mg IV x 1, and Pantoprazole 40mg IV x 1. Cased was Discussed with Dr. Tinajero who recommended Admission for Pain Control and Possible EGD in AM. Patient is a 62 year old female with past medical history of Possible Peptic Ulcer Disease, Hypothyroidism (S/P Thyroidectomy for "Overactive Thyroid" on Levothyroxine), Asthma (Well Controlled) presenting with Acute Onset Epigastric Pain.     Patient reports Epigastric Pain Started on Monday, 10/23. Patient reports Epigastric Pain was Slight, and Did Not Bother Her Much. On 10/29, Patient reports Getting Up in AM, Feeling Well, No Pain. However, at Work was Eating Breakfast and Drinking Coffee and Developed Acute Epigastric Pain While Eating. Patient reports Pain 10/10 in Severity, Described as "Sharp", and "Twisting" Sensation, Non-Radiating. Patient reports "Like Giving Birth". Patient denies Back Pain. Patient denies Associated Nausea, Vomiting, Diarrhea, Constipation, and reports Passing Flatus. Patient denies Melena, Hematochezia, Pain denies Weight Loss or Night Sweats. Patient denies Chest Pain, SOB, Palpitations, Dizziness/Lightheadedness, or Diaphoresis. Patient denies Dysuria, Hematuria, Frequency, Flank Pain. Patient denies Pain/Swelling of Lower Extremities, Rash, Arthalgias or Myalgais.     Patient denies ETOH or NSAIDs.        In the ED, Vitals were TMax 99.5, HR 66-97, -158/67-73, RR 18, SpO2 96-99% on RA. Labs Notable for Elevated Lipase of 224. CT Abdomen Pelvis with PO and IV Contrast demonstrating "thickening involving the origin and proximal segment of the appendix which measures up to 10 mm and distal segments of the appendix are normal in caliber and  filled with gas" and as per Radiology Read "cannot entirely exclude an early evolving appendicitis". Surgery Consulted, and CT Repeated without Contrast, which was read as "no significant change in mild thickening along the proximal appendix just distal to its origin off the base of the cecum" "there is no surrounding fat infiltration or free fluid to suggest active inflammatory change". Surgery determined No Surgical Intervention. Patient given 1L NS, Zofran 4mg IV x 1, Morphine 4mg IV x 3, Pepcid 20mg IV x 1, and Pantoprazole 40mg IV x 1. Cased was Discussed with Dr. Tinajero who recommended Admission for Pain Control and Possible EGD in AM.

## 2017-10-30 NOTE — H&P ADULT - PROBLEM SELECTOR PLAN 2
Lipase Mildly Elevated, Etiology Unclear. No ETOH Consumption. Patient with Cholelithiasis on CT Scan, However, No Evidence of Biliary Obstruction Noted.   -Surgery Consulted in ED, Do Not Believe Appendicitis, but Do Recommend Surgery Follow-Up Outpatient for Elective Laparoscopic Cholecystectomy.

## 2017-10-30 NOTE — DISCHARGE NOTE ADULT - PATIENT PORTAL LINK FT
“You can access the [employer] Patient Portal, offered by [facility], by registering with the following website: [email or web address]”

## 2017-10-30 NOTE — DISCHARGE NOTE ADULT - PLAN OF CARE
To identify and treat the source of the pain You were admitted for extreme abdominal pain. You were initially scheduled to have an endoscopy to treat a presumed peptic ulcer, but given findings on radiology and the history you shared, it appears that your pain may be due to gall bladder pain stimulated by fatty foods. Please follow up with your primary care doctor Dr. Tinajero within one week to determine your future course of care, including the need for endoscopy. You should follow up as well with surgery regarding suggested removal of your gall bladder. To have normal thyroid levels You have a history of low thyroid levels following a surgery to remove your thyroid. During this hospital visit, your thyroid levels were found to be too high due to a high dose of thyroid replacement. Please do not take your thyroid supplement for the next three days until you visit your primary care doctor, who will restart you on a lower dose of the medicine.

## 2017-10-30 NOTE — H&P ADULT - NSHPLABSRESULTS_GEN_ALL_CORE
CBC Full  -  ( 29 Oct 2017 15:24 )  WBC Count : 7.8 K/uL  Hemoglobin : 12.3 g/dL  Hematocrit : 35.1 %  Platelet Count - Automated : 343 K/uL  Mean Cell Volume : 84.4 fL  Mean Cell Hemoglobin : 29.6 pg  Mean Cell Hemoglobin Concentration : 35.0 g/dL  Auto Neutrophil # : x  Auto Lymphocyte # : x  Auto Monocyte # : x  Auto Eosinophil # : x  Auto Basophil # : x  Auto Neutrophil % : 59.6 %  Auto Lymphocyte % : 27.9 %  Auto Monocyte % : 8.4 %  Auto Eosinophil % : 3.1 %  Auto Basophil % : 1.0 %    PT/INR - ( 29 Oct 2017 15:24 )   PT: 10.8 sec;   INR: 0.97          PTT - ( 29 Oct 2017 15:24 )  PTT:31.1 sec    10-29    138  |  99  |  14  ----------------------------<  117<H>  3.8   |  24  |  0.73    Ca    9.8      29 Oct 2017 15:24    TPro  7.8  /  Alb  4.6  /  TBili  0.2  /  DBili  x   /  AST  16  /  ALT  10  /  AlkPhos  94  10-29    Lipase 224    Urinalysis Basic - ( 29 Oct 2017 21:04 )    Color: Yellow / Appearance: Clear / SG: <=1.005 / pH: x  Gluc: x / Ketone: NEGATIVE  / Bili: Negative / Urobili: 0.2 E.U./dL   Blood: x / Protein: NEGATIVE mg/dL / Nitrite: NEGATIVE   Leuk Esterase: NEGATIVE / RBC: x / WBC x   Sq Epi: x / Non Sq Epi: x / Bacteria: x    CT Abdomen and Pelvis w/ Oral Cont and w/ IV Cont     IMPRESSION:     1. Thickening involving the origin and proximal segment of the appendix   which measures up to 10 mm, the mid and distal segments of the appendix   are normal in caliber and filled with gas. No periappendiceal fat   stranding or free fluid. Cannot entirely exclude an early evolving   appendicitis and further correlation with the patient's clinical symptoms   are recommended. Cholelithiasis with no evidence of cholecystitis.  3. No evidence of pancreatitis.    These findings were discussed with Dr. Mahajan at 7:21PM on 10/29/2017.    "Thank you for the opportunity to participate in the care of this   patient."    NICANOR GRIMES M.D., RADIOLOGY RESIDENT  This document has been electronically signed.  LUISA REIS M.D., ATTENDING RADIOLOGIST  This document has been electronically signed. Oct 29 2017  7:38PM      CT Abdomen and Pelvis No Cont (10.29.17 @ 22:34)    IMPRESSION:  No significant change in mild thickening along the proximal appendix just   distal to its origin off the base of the cecum, however there is no   surrounding fat infiltration or free fluid to suggest active inflammatory   change. The mid and distal segments of the appendix are normal in caliber   and filled with gas. These findings indicate a normal appendix.    Cholelithiasis.    "Thank you for the opportunity to participate in the care of this   patient."    LUISA REIS M.D., ATTENDING RADIOLOGIST  This document has been electronically signed. Oct 29 2017 10:36PM

## 2017-10-30 NOTE — PROGRESS NOTE ADULT - ASSESSMENT
patient feels much better today.  rescan showed normal appendix.  Per family she ate at dinner with eggs and gray prior to pain probably had biliary colic.  Defer egd  MAY EAT AND IF WELL will discharge home on lower dose synthroid and will refer to surgery and perhaps egd as out patient

## 2017-10-30 NOTE — DISCHARGE NOTE ADULT - MEDICATION SUMMARY - MEDICATIONS TO STOP TAKING
I will STOP taking the medications listed below when I get home from the hospital:    Synthroid 100 mcg (0.1 mg) oral tablet  -- 1 tab(s) by mouth once a day

## 2017-10-30 NOTE — H&P ADULT - ASSESSMENT
Patient is a 62 year old female with past medical history of Hypothyroidism (S/P Thyroidectomy for "Overactive Thyroid" on Levothyroxine), Asthma (Well Controlled) presenting with Acute Onset Epigastric Pain, likely Peptic Ulcer Disease.

## 2017-10-30 NOTE — H&P ADULT - PROBLEM SELECTOR PLAN 3
No Signs or Symptoms of Hypothyroidism.   -Continue Synthroid at 100mcg PO Q daily, Patient reports this is Home Dose.   -Check TSH in AM.

## 2017-10-30 NOTE — DISCHARGE NOTE ADULT - HOSPITAL COURSE
Patient is a 62 year old female with past medical history of Possible Peptic Ulcer Disease, Hypothyroidism (S/P Thyroidectomy for "Overactive Thyroid" on Levothyroxine), Asthma (Well Controlled) who presented with Acute Onset Epigastric Pain. Labs on admission notable for Elevated Lipase of 224. CT Abdomen Pelvis with PO and IV Contrast demonstrating "thickening involving the origin and proximal segment of the appendix which measures up to 10 mm and distal segments of the appendix are normal in caliber and  filled with gas" and as per Radiology Read "cannot entirely exclude an early evolving appendicitis". Surgery Consulted, and CT Repeated without Contrast, which was read as "no significant change in mild thickening along the proximal appendix just distal to its origin off the base of the cecum" "there is no surrounding fat infiltration or free fluid to suggest active inflammatory change". Surgery determined No Surgical Intervention. but recommended outpatient cholecystectomy. Patient given 1L NS, Zofran 4mg IV x 1, Morphine 4mg IV x 3, Pepcid 20mg IV x 1, and Pantoprazole 40mg IV x 1. Cased was Discussed with Dr. Tinajero who recommended Admission for Pain Control and Possible EGD in AM. As patient's condition improved, she provided greater detail and admitted to eating a high fat meal prior to the pain beginning. In combination with radiologic findings, pain was evaluated to be likely due to biliary colic. Patient was medically stable with pain well controlled and discharged home with instructions to follow up with surgery and with her primary care and GI physicians. In addition, patient's TSH found to be low 0.22 during admission and Synthroid held on discharge, to be resumed at low dose by outpatient physician.

## 2017-11-08 DIAGNOSIS — R10.13 EPIGASTRIC PAIN: ICD-10-CM

## 2017-11-08 DIAGNOSIS — Z28.21 IMMUNIZATION NOT CARRIED OUT BECAUSE OF PATIENT REFUSAL: ICD-10-CM

## 2017-11-08 DIAGNOSIS — J45.909 UNSPECIFIED ASTHMA, UNCOMPLICATED: ICD-10-CM

## 2017-11-08 DIAGNOSIS — K82.9 DISEASE OF GALLBLADDER, UNSPECIFIED: ICD-10-CM

## 2017-11-08 DIAGNOSIS — E03.9 HYPOTHYROIDISM, UNSPECIFIED: ICD-10-CM

## 2017-11-09 DIAGNOSIS — K21.9 GASTRO-ESOPHAGEAL REFLUX DISEASE WITHOUT ESOPHAGITIS: ICD-10-CM

## 2017-11-09 DIAGNOSIS — K80.51 CALCULUS OF BILE DUCT WITHOUT CHOLANGITIS OR CHOLECYSTITIS WITH OBSTRUCTION: ICD-10-CM

## 2017-11-09 DIAGNOSIS — K80.20 CALCULUS OF GALLBLADDER WITHOUT CHOLECYSTITIS WITHOUT OBSTRUCTION: ICD-10-CM

## 2017-11-19 ENCOUNTER — EMERGENCY (EMERGENCY)
Facility: HOSPITAL | Age: 62
LOS: 1 days | Discharge: HOME | End: 2017-11-19
Admitting: EMERGENCY MEDICINE
Payer: COMMERCIAL

## 2017-11-19 VITALS
DIASTOLIC BLOOD PRESSURE: 88 MMHG | SYSTOLIC BLOOD PRESSURE: 157 MMHG | WEIGHT: 145.06 LBS | OXYGEN SATURATION: 98 % | TEMPERATURE: 98 F | HEART RATE: 91 BPM | HEIGHT: 59 IN | RESPIRATION RATE: 18 BRPM

## 2017-11-19 DIAGNOSIS — J06.9 ACUTE UPPER RESPIRATORY INFECTION, UNSPECIFIED: ICD-10-CM

## 2017-11-19 DIAGNOSIS — Z79.899 OTHER LONG TERM (CURRENT) DRUG THERAPY: ICD-10-CM

## 2017-11-19 DIAGNOSIS — J45.909 UNSPECIFIED ASTHMA, UNCOMPLICATED: ICD-10-CM

## 2017-11-19 DIAGNOSIS — R05 COUGH: ICD-10-CM

## 2017-11-19 DIAGNOSIS — E03.9 HYPOTHYROIDISM, UNSPECIFIED: ICD-10-CM

## 2017-11-19 PROCEDURE — 99283 EMERGENCY DEPT VISIT LOW MDM: CPT | Mod: 25

## 2017-11-19 PROCEDURE — 71020: CPT | Mod: 26

## 2017-11-19 PROCEDURE — 99284 EMERGENCY DEPT VISIT MOD MDM: CPT

## 2017-11-19 PROCEDURE — 71046 X-RAY EXAM CHEST 2 VIEWS: CPT

## 2017-11-19 PROCEDURE — 94640 AIRWAY INHALATION TREATMENT: CPT

## 2017-11-19 RX ORDER — FLUTICASONE PROPIONATE 50 MCG
1 SPRAY, SUSPENSION NASAL
Qty: 1 | Refills: 0 | OUTPATIENT
Start: 2017-11-19 | End: 2017-12-19

## 2017-11-19 RX ORDER — ALBUTEROL 90 UG/1
2.5 AEROSOL, METERED ORAL ONCE
Qty: 0 | Refills: 0 | Status: COMPLETED | OUTPATIENT
Start: 2017-11-19 | End: 2017-11-19

## 2017-11-19 RX ORDER — ALBUTEROL 90 UG/1
2 AEROSOL, METERED ORAL
Qty: 0 | Refills: 0 | COMMUNITY

## 2017-11-19 RX ADMIN — ALBUTEROL 2.5 MILLIGRAM(S): 90 AEROSOL, METERED ORAL at 12:49

## 2017-11-19 NOTE — ED ADULT NURSE NOTE - OBJECTIVE STATEMENT
Pt states she started to have a very bad cough yesterday and it now hurts to cough. pt states she has clear productive cough, no fever, chills, no sore throat, NVD.

## 2017-11-19 NOTE — ED PROVIDER NOTE - OBJECTIVE STATEMENT
63 yo F with pmh of PUD, hypothyroidism, ?asthma c/o productive cough with white sputum since yesterday. Associated with nasal congestion, HA and subjective fever. Denies cp, sob, sore throat, earache, n/v, sick contacts or recent travel. Nonsmoker.

## 2017-11-19 NOTE — ED ADULT NURSE NOTE - CHIEF COMPLAINT QUOTE
Patient c/o productive cough started this morning , denies any fever nor chills . No wheezing noted .

## 2017-11-19 NOTE — ED PROVIDER NOTE - PHYSICAL EXAMINATION
CONSTITUTIONAL: Well-appearing; well-nourished; in no apparent distress.   HEAD: Normocephalic; atraumatic.   EYES: PERRL; EOM intact; conjunctiva and sclera clear  ENT: normal nose; no rhinorrhea; normal pharynx with no erythema or lesions.   NECK: Supple; non-tender;   CARDIOVASCULAR: Normal S1, S2; no murmurs, rubs, or gallops. Regular rate and rhythm.   RESPIRATORY: +active coughing, Breathing easily; breath sounds clear and equal bilaterally; no wheezes, rhonchi, or rales.  MSK: FROM at all extremities, normal tone   EXT: No cyanosis or edema; N/V intact  SKIN: Normal for age and race; warm; dry; good turgor; no apparent lesions or rash.

## 2017-11-19 NOTE — ED ADULT NURSE NOTE - CHPI ED SYMPTOMS NEG
no diaphoresis/no hemoptysis/no wheezing/no fever/no body aches/no chest pain/no edema/no shortness of breath/no chills

## 2017-11-19 NOTE — ED PROVIDER NOTE - MEDICAL DECISION MAKING DETAILS
63 yo f with hx of pud, hyporthyroidism, ?asthma with cough x 2 days. VSS, lungs cta b/l. Likely viral URI. r/o pna.

## 2017-11-19 NOTE — ED ADULT TRIAGE NOTE - CHIEF COMPLAINT QUOTE
Patient c/o productive cough started this morning , denies any fever nor chills . Patient c/o productive cough started this morning , denies any fever nor chills . No wheezing noted .

## 2018-01-07 NOTE — ED ADULT TRIAGE NOTE - LOCATION:
Left arm; I have personally performed a face to face diagnostic evaluation on this patient. I have reviewed the ACP note and agree with the history, exam and plan of care, except as noted.

## 2019-12-11 NOTE — ED ADULT NURSE NOTE - NS ED NURSE REPORT GIVEN DT
As certified below, I, or a nurse practitioner or physician assistant working with me, had a face-to-face encounter that meets the physician face-to-face encounter requirements. 30-Oct-2017 00:28

## 2020-10-15 NOTE — PATIENT PROFILE ADULT. - ARE SIGNIFICANT INDICATORS COMPLETE.
Pt presented to ED c/o wrist pain. Pt was seen in urgent care x1 day ago for right wrist pain, x-rays negative at urgent care. Pt instructed to come to ED for further evaluation. Pt denies injury, denies fall to wrist. Denies n/v/d/fevers/chills. + pulses noted. Yes

## 2021-06-23 NOTE — ED PROVIDER NOTE - SHIFT CHANGE
[FreeTextEntry8] : \par 50 year old male presents c/o neck pain and upper to mid back pain x 3 days\par had been working outdoors- doing patchwork to his driveway\par no injury or trauma\par no associated numbness or tingling\par has tried Advil w/o improvement\par has tried applying over the counter Salonpas patch w/o improvement Yes...

## 2022-02-24 NOTE — ED PROVIDER NOTE - NS ED MD DISPO SPECIAL CONSIDERATION1
Problem: Risk for Self Injury/Neglect  Goal: Treatment Goal: Remain safe during length of stay, learn and adopt new coping skills, and be free of self-injurious ideation, impulses and acts at the time of discharge  Outcome: Progressing  Goal: Verbalize thoughts and feelings  Description: Interventions:  - Assess and re-assess patient's lethality and potential for self-injury  - Engage patient in 1:1 interactions, daily, for a minimum of 15 minutes  - Encourage patient to express feelings, fears, frustrations, hopes  - Establish rapport/trust with patient   Outcome: Progressing  Goal: Refrain from harming self  Description: Interventions:  - Monitor patient closely, per order  - Develop a trusting relationship  - Supervise medication ingestion, monitor effects and side effects   Outcome: Progressing     Problem: Depression  Goal: Treatment Goal: Demonstrate behavioral control of depressive symptoms, verbalize feelings of improved mood/affect, and adopt new coping skills prior to discharge  Outcome: Progressing  Goal: Verbalize thoughts and feelings  Description: Interventions:  - Assess and re-assess patient's level of risk   - Engage patient in 1:1 interactions, daily, for a minimum of 15 minutes   - Encourage patient to express feelings, fears, frustrations, hopes   Outcome: Progressing  Goal: Refrain from harming self  Description: Interventions:  - Monitor patient closely, per order   - Supervise medication ingestion, monitor effects and side effects   Outcome: Progressing  Goal: Refrain from isolation  Description: Interventions:  - Develop a trusting relationship   - Encourage socialization   Outcome: Progressing     Problem: Anxiety  Goal: Anxiety is at manageable level  Description: Interventions:  - Assess and monitor patient's anxiety level  - Monitor for signs and symptoms (heart palpitations, chest pain, shortness of breath, headaches, nausea, feeling jumpy, restlessness, irritable, apprehensive)     - Collaborate with interdisciplinary team and initiate plan and interventions as ordered  - [place] patient to unit/surroundings  - Explain treatment plan  - Encourage participation in care  - Encourage verbalization of concerns/fears  - Identify coping mechanisms  - Assist in developing anxiety-reducing skills  - Administer/offer alternative therapies  - Limit or eliminate stimulants  Outcome: Progressing     Problem: Nutrition/Hydration-ADULT  Goal: Nutrient/Hydration intake appropriate for improving, restoring or maintaining nutritional needs  Description: Monitor and assess patient's nutrition/hydration status for malnutrition  Collaborate with interdisciplinary team and initiate plan and interventions as ordered  Monitor patient's weight and dietary intake as ordered or per policy  Utilize nutrition screening tool and intervene as necessary  Determine patient's food preferences and provide high-protein, high-caloric foods as appropriate       INTERVENTIONS:  - Monitor oral intake, urinary output, labs, and treatment plans  - Assess nutrition and hydration status and recommend course of action  - Evaluate amount of meals eaten  - Assist patient with eating if necessary   - Allow adequate time for meals  - Recommend/ encourage appropriate diets, oral nutritional supplements, and vitamin/mineral supplements  - Order, calculate, and assess calorie counts as needed  - Recommend, monitor, and adjust tube feedings and TPN/PPN based on assessed needs  - Assess need for intravenous fluids  - Provide specific nutrition/hydration education as appropriate  - Include patient/family/caregiver in decisions related to nutrition  Outcome: Progressing     Problem: DISCHARGE PLANNING - CARE MANAGEMENT  Goal: Discharge to post-acute care or home with appropriate resources  Description: INTERVENTIONS:  - Conduct assessment to determine patient/family and health care team treatment goals, and need for post-acute services based on payer coverage, community resources, and patient preferences, and barriers to discharge  - Address psychosocial, clinical, and financial barriers to discharge as identified in assessment in conjunction with the patient/family and health care team  - Arrange appropriate level of post-acute services according to patients   needs and preference and payer coverage in collaboration with the physician and health care team  - Communicate with and update the patient/family, physician, and health care team regarding progress on the discharge plan  - Arrange appropriate transportation to post-acute venues  Outcome: Progressing     Problem: Ineffective Coping  Goal: Participates in unit activities  Description: Interventions:  - Provide therapeutic environment   - Provide required programming   - Redirect inappropriate behaviors   Outcome: Progressing None

## 2023-06-13 NOTE — H&P ADULT - PSH
well developed, well nourished , in no acute distress , ambulating without difficulty , normal communication ability
Other postprocedural status  S/P thyroidectomy

## 2023-10-09 NOTE — H&P ADULT - NSHPPOAPULMEMBOLUS_GEN_A_CORE
Followed up with pt re Nodify testing.  Pt states [employer] saida blood last week.  [facility] will follow up results.   no

## 2024-08-12 ENCOUNTER — RX ONLY (RX ONLY)
Age: 69
End: 2024-08-12

## 2024-08-12 ENCOUNTER — OFFICE (OUTPATIENT)
Dept: URBAN - METROPOLITAN AREA CLINIC 92 | Facility: CLINIC | Age: 69
Setting detail: OPHTHALMOLOGY
End: 2024-08-12
Payer: COMMERCIAL

## 2024-08-12 DIAGNOSIS — H25.13: ICD-10-CM

## 2024-08-12 DIAGNOSIS — H25.11: ICD-10-CM

## 2024-08-12 DIAGNOSIS — H43.813: ICD-10-CM

## 2024-08-12 PROBLEM — H25.12 CATARACT SENILE NUCLEAR SCLEROSIS; RIGHT EYE, LEFT EYE, BOTH EYES: Status: ACTIVE | Noted: 2024-08-12

## 2024-08-12 PROCEDURE — 92136 OPHTHALMIC BIOMETRY: CPT | Mod: RT | Performed by: SPECIALIST

## 2024-08-12 PROCEDURE — 92136 OPHTHALMIC BIOMETRY: CPT | Mod: TC | Performed by: SPECIALIST

## 2024-08-12 PROCEDURE — 92004 COMPRE OPH EXAM NEW PT 1/>: CPT | Performed by: SPECIALIST

## 2024-08-12 ASSESSMENT — CONFRONTATIONAL VISUAL FIELD TEST (CVF)
OS_FINDINGS: FULL
OD_FINDINGS: FULL

## 2024-09-10 ENCOUNTER — AMBULATORY SURGERY CENTER (OUTPATIENT)
Dept: URBAN - METROPOLITAN AREA SURGERY 23 | Facility: SURGERY | Age: 69
Setting detail: OPHTHALMOLOGY
End: 2024-09-10
Payer: COMMERCIAL

## 2024-09-10 DIAGNOSIS — H25.11: ICD-10-CM

## 2024-09-10 PROCEDURE — 66984 XCAPSL CTRC RMVL W/O ECP: CPT | Mod: RT | Performed by: SPECIALIST

## 2024-09-11 ENCOUNTER — RX ONLY (RX ONLY)
Age: 69
End: 2024-09-11

## 2024-09-11 ENCOUNTER — OFFICE (OUTPATIENT)
Dept: URBAN - METROPOLITAN AREA CLINIC 92 | Facility: CLINIC | Age: 69
Setting detail: OPHTHALMOLOGY
End: 2024-09-11
Payer: COMMERCIAL

## 2024-09-11 DIAGNOSIS — Z96.1: ICD-10-CM

## 2024-09-11 PROCEDURE — 99024 POSTOP FOLLOW-UP VISIT: CPT | Performed by: OPHTHALMOLOGY

## 2024-09-16 ENCOUNTER — AMBULATORY SURGERY CENTER (OUTPATIENT)
Dept: URBAN - METROPOLITAN AREA SURGERY 23 | Facility: SURGERY | Age: 69
Setting detail: OPHTHALMOLOGY
End: 2024-09-16
Payer: COMMERCIAL

## 2024-09-16 DIAGNOSIS — H25.12: ICD-10-CM

## 2024-09-16 PROCEDURE — 66984 XCAPSL CTRC RMVL W/O ECP: CPT | Mod: 79,LT | Performed by: SPECIALIST

## 2024-09-17 ENCOUNTER — OFFICE (OUTPATIENT)
Dept: URBAN - METROPOLITAN AREA CLINIC 92 | Facility: CLINIC | Age: 69
Setting detail: OPHTHALMOLOGY
End: 2024-09-17
Payer: COMMERCIAL

## 2024-09-17 DIAGNOSIS — Z96.1: ICD-10-CM

## 2024-09-17 PROCEDURE — 99024 POSTOP FOLLOW-UP VISIT: CPT | Performed by: OPHTHALMOLOGY

## 2024-09-23 ENCOUNTER — OFFICE (OUTPATIENT)
Dept: URBAN - METROPOLITAN AREA CLINIC 92 | Facility: CLINIC | Age: 69
Setting detail: OPHTHALMOLOGY
End: 2024-09-23
Payer: COMMERCIAL

## 2024-09-23 DIAGNOSIS — Z96.1: ICD-10-CM

## 2024-09-23 PROCEDURE — 99024 POSTOP FOLLOW-UP VISIT: CPT | Performed by: SPECIALIST

## 2024-09-23 ASSESSMENT — CONFRONTATIONAL VISUAL FIELD TEST (CVF)
OD_FINDINGS: FULL
OS_FINDINGS: FULL

## 2024-10-17 ENCOUNTER — OFFICE (OUTPATIENT)
Dept: URBAN - METROPOLITAN AREA CLINIC 92 | Facility: CLINIC | Age: 69
Setting detail: OPHTHALMOLOGY
End: 2024-10-17
Payer: COMMERCIAL

## 2024-10-17 DIAGNOSIS — Z96.1: ICD-10-CM

## 2024-10-17 PROCEDURE — 99024 POSTOP FOLLOW-UP VISIT: CPT | Performed by: SPECIALIST

## 2024-10-17 ASSESSMENT — KERATOMETRY
OS_K1POWER_DIOPTERS: 45.00
OD_K1POWER_DIOPTERS: 44.00
METHOD_AUTO_MANUAL: AUTO
OD_AXISANGLE_DEGREES: 163
OD_K2POWER_DIOPTERS: 44.75
OS_AXISANGLE_DEGREES: 090
OS_K2POWER_DIOPTERS: 45.00

## 2024-10-17 ASSESSMENT — REFRACTION_AUTOREFRACTION
OD_AXIS: 179
OD_CYLINDER: +1.50
OD_SPHERE: -1.25
OS_AXIS: 172
OS_SPHERE: -1.75
OS_CYLINDER: +1.00

## 2024-10-17 ASSESSMENT — VISUAL ACUITY
OD_BCVA: 20/40
OS_BCVA: 20/20-2

## 2025-05-03 ENCOUNTER — EMERGENCY (EMERGENCY)
Facility: HOSPITAL | Age: 70
LOS: 1 days | End: 2025-05-03
Attending: STUDENT IN AN ORGANIZED HEALTH CARE EDUCATION/TRAINING PROGRAM | Admitting: STUDENT IN AN ORGANIZED HEALTH CARE EDUCATION/TRAINING PROGRAM
Payer: SELF-PAY

## 2025-05-03 VITALS
HEART RATE: 70 BPM | RESPIRATION RATE: 18 BRPM | SYSTOLIC BLOOD PRESSURE: 147 MMHG | TEMPERATURE: 98 F | WEIGHT: 134.92 LBS | DIASTOLIC BLOOD PRESSURE: 69 MMHG | OXYGEN SATURATION: 90 %

## 2025-05-03 VITALS
SYSTOLIC BLOOD PRESSURE: 138 MMHG | TEMPERATURE: 98 F | DIASTOLIC BLOOD PRESSURE: 69 MMHG | HEART RATE: 62 BPM | RESPIRATION RATE: 19 BRPM | OXYGEN SATURATION: 95 %

## 2025-05-03 LAB
ANION GAP SERPL CALC-SCNC: 17 MMOL/L — SIGNIFICANT CHANGE UP (ref 5–17)
BASOPHILS # BLD AUTO: 0.08 K/UL — SIGNIFICANT CHANGE UP (ref 0–0.2)
BASOPHILS NFR BLD AUTO: 0.8 % — SIGNIFICANT CHANGE UP (ref 0–2)
BUN SERPL-MCNC: 9 MG/DL — SIGNIFICANT CHANGE UP (ref 7–23)
CALCIUM SERPL-MCNC: 9.5 MG/DL — SIGNIFICANT CHANGE UP (ref 8.4–10.5)
CHLORIDE SERPL-SCNC: 104 MMOL/L — SIGNIFICANT CHANGE UP (ref 96–108)
CO2 SERPL-SCNC: 20 MMOL/L — LOW (ref 22–31)
CREAT SERPL-MCNC: 0.66 MG/DL — SIGNIFICANT CHANGE UP (ref 0.5–1.3)
EGFR: 94 ML/MIN/1.73M2 — SIGNIFICANT CHANGE UP
EGFR: 94 ML/MIN/1.73M2 — SIGNIFICANT CHANGE UP
EOSINOPHIL # BLD AUTO: 0.17 K/UL — SIGNIFICANT CHANGE UP (ref 0–0.5)
EOSINOPHIL NFR BLD AUTO: 1.7 % — SIGNIFICANT CHANGE UP (ref 0–6)
FLUAV AG NPH QL: SIGNIFICANT CHANGE UP
FLUBV AG NPH QL: SIGNIFICANT CHANGE UP
GLUCOSE SERPL-MCNC: 125 MG/DL — HIGH (ref 70–99)
HCT VFR BLD CALC: 34.7 % — SIGNIFICANT CHANGE UP (ref 34.5–45)
HGB BLD-MCNC: 11.6 G/DL — SIGNIFICANT CHANGE UP (ref 11.5–15.5)
IMM GRANULOCYTES NFR BLD AUTO: 1.2 % — HIGH (ref 0–0.9)
LACTATE BLDV-MCNC: 1.8 MMOL/L — SIGNIFICANT CHANGE UP (ref 0.5–2)
LYMPHOCYTES # BLD AUTO: 2.86 K/UL — SIGNIFICANT CHANGE UP (ref 1–3.3)
LYMPHOCYTES # BLD AUTO: 29.3 % — SIGNIFICANT CHANGE UP (ref 13–44)
MCHC RBC-ENTMCNC: 28.9 PG — SIGNIFICANT CHANGE UP (ref 27–34)
MCHC RBC-ENTMCNC: 33.4 G/DL — SIGNIFICANT CHANGE UP (ref 32–36)
MCV RBC AUTO: 86.3 FL — SIGNIFICANT CHANGE UP (ref 80–100)
MONOCYTES # BLD AUTO: 1.19 K/UL — HIGH (ref 0–0.9)
MONOCYTES NFR BLD AUTO: 12.2 % — SIGNIFICANT CHANGE UP (ref 2–14)
NEUTROPHILS # BLD AUTO: 5.34 K/UL — SIGNIFICANT CHANGE UP (ref 1.8–7.4)
NEUTROPHILS NFR BLD AUTO: 54.8 % — SIGNIFICANT CHANGE UP (ref 43–77)
NRBC BLD AUTO-RTO: 0 /100 WBCS — SIGNIFICANT CHANGE UP (ref 0–0)
PLATELET # BLD AUTO: 313 K/UL — SIGNIFICANT CHANGE UP (ref 150–400)
POTASSIUM SERPL-MCNC: 3.6 MMOL/L — SIGNIFICANT CHANGE UP (ref 3.5–5.3)
POTASSIUM SERPL-SCNC: 3.6 MMOL/L — SIGNIFICANT CHANGE UP (ref 3.5–5.3)
RBC # BLD: 4.02 M/UL — SIGNIFICANT CHANGE UP (ref 3.8–5.2)
RBC # FLD: 13.2 % — SIGNIFICANT CHANGE UP (ref 10.3–14.5)
RSV RNA NPH QL NAA+NON-PROBE: SIGNIFICANT CHANGE UP
SARS-COV-2 RNA SPEC QL NAA+PROBE: SIGNIFICANT CHANGE UP
SODIUM SERPL-SCNC: 141 MMOL/L — SIGNIFICANT CHANGE UP (ref 135–145)
SOURCE RESPIRATORY: SIGNIFICANT CHANGE UP
WBC # BLD: 9.76 K/UL — SIGNIFICANT CHANGE UP (ref 3.8–10.5)
WBC # FLD AUTO: 9.76 K/UL — SIGNIFICANT CHANGE UP (ref 3.8–10.5)

## 2025-05-03 PROCEDURE — 96375 TX/PRO/DX INJ NEW DRUG ADDON: CPT

## 2025-05-03 PROCEDURE — 36415 COLL VENOUS BLD VENIPUNCTURE: CPT

## 2025-05-03 PROCEDURE — 93005 ELECTROCARDIOGRAM TRACING: CPT

## 2025-05-03 PROCEDURE — 99285 EMERGENCY DEPT VISIT HI MDM: CPT | Mod: 25

## 2025-05-03 PROCEDURE — 83880 ASSAY OF NATRIURETIC PEPTIDE: CPT

## 2025-05-03 PROCEDURE — 99285 EMERGENCY DEPT VISIT HI MDM: CPT

## 2025-05-03 PROCEDURE — 84484 ASSAY OF TROPONIN QUANT: CPT

## 2025-05-03 PROCEDURE — 96374 THER/PROPH/DIAG INJ IV PUSH: CPT

## 2025-05-03 PROCEDURE — 85025 COMPLETE CBC W/AUTO DIFF WBC: CPT

## 2025-05-03 PROCEDURE — 93010 ELECTROCARDIOGRAM REPORT: CPT

## 2025-05-03 PROCEDURE — 71045 X-RAY EXAM CHEST 1 VIEW: CPT

## 2025-05-03 PROCEDURE — 83605 ASSAY OF LACTIC ACID: CPT

## 2025-05-03 PROCEDURE — 80048 BASIC METABOLIC PNL TOTAL CA: CPT

## 2025-05-03 PROCEDURE — 87040 BLOOD CULTURE FOR BACTERIA: CPT

## 2025-05-03 PROCEDURE — 87637 SARSCOV2&INF A&B&RSV AMP PRB: CPT

## 2025-05-03 PROCEDURE — 71045 X-RAY EXAM CHEST 1 VIEW: CPT | Mod: 26

## 2025-05-03 RX ORDER — AMOXICILLIN AND CLAVULANATE POTASSIUM 500; 125 MG/1; MG/1
1 TABLET, FILM COATED ORAL
Qty: 6 | Refills: 0
Start: 2025-05-03 | End: 2025-05-05

## 2025-05-03 RX ORDER — AZITHROMYCIN 250 MG
250 CAPSULE ORAL ONCE
Refills: 0 | Status: COMPLETED | OUTPATIENT
Start: 2025-05-03 | End: 2025-05-03

## 2025-05-03 RX ORDER — DEXTROMETHORPHAN HBR, GUAIFENESIN 200 MG/10ML
200 LIQUID ORAL ONCE
Refills: 0 | Status: COMPLETED | OUTPATIENT
Start: 2025-05-03 | End: 2025-05-03

## 2025-05-03 RX ORDER — CEFTRIAXONE 500 MG/1
1000 INJECTION, POWDER, FOR SOLUTION INTRAMUSCULAR; INTRAVENOUS ONCE
Refills: 0 | Status: COMPLETED | OUTPATIENT
Start: 2025-05-03 | End: 2025-05-03

## 2025-05-03 RX ORDER — AZITHROMYCIN 250 MG
250 CAPSULE ORAL
Qty: 3 | Refills: 0
Start: 2025-05-03 | End: 2025-05-05

## 2025-05-03 RX ORDER — AZITHROMYCIN 250 MG
500 CAPSULE ORAL ONCE
Refills: 0 | Status: COMPLETED | OUTPATIENT
Start: 2025-05-03 | End: 2025-05-03

## 2025-05-03 RX ORDER — ACETAMINOPHEN 500 MG/5ML
650 LIQUID (ML) ORAL ONCE
Refills: 0 | Status: COMPLETED | OUTPATIENT
Start: 2025-05-03 | End: 2025-05-03

## 2025-05-03 RX ORDER — AMOXICILLIN AND CLAVULANATE POTASSIUM 500; 125 MG/1; MG/1
2 TABLET, FILM COATED ORAL ONCE
Refills: 0 | Status: COMPLETED | OUTPATIENT
Start: 2025-05-03 | End: 2025-05-03

## 2025-05-03 RX ADMIN — Medication 255 MILLIGRAM(S): at 14:51

## 2025-05-03 RX ADMIN — Medication 650 MILLIGRAM(S): at 13:24

## 2025-05-03 RX ADMIN — DEXTROMETHORPHAN HBR, GUAIFENESIN 200 MILLIGRAM(S): 200 LIQUID ORAL at 14:17

## 2025-05-03 RX ADMIN — AMOXICILLIN AND CLAVULANATE POTASSIUM 2 TABLET(S): 500; 125 TABLET, FILM COATED ORAL at 16:09

## 2025-05-03 RX ADMIN — CEFTRIAXONE 100 MILLIGRAM(S): 500 INJECTION, POWDER, FOR SOLUTION INTRAMUSCULAR; INTRAVENOUS at 14:44

## 2025-05-03 RX ADMIN — Medication 250 MILLIGRAM(S): at 16:09

## 2025-05-03 NOTE — ED ADULT NURSE NOTE - OBJECTIVE STATEMENT
Patient presents to the ED complaining of cough, shortness of breath and chest pain for the past few days. Patient with a history of asthma. O2 saturation to 90% on room air, placed on 2L nc. Patient presents to the ED complaining of cough, shortness of breath and chest pain for the past few days. Patient with a history of asthma. No history of intubation. O2 saturation to 90% on room air, placed on 2L nc.

## 2025-05-03 NOTE — ED PROVIDER NOTE - PATIENT PORTAL LINK FT
You can access the FollowMyHealth Patient Portal offered by [facility] by registering at the following website: [email or web address]. By joining [facility]’s FollowMyHealth portal, you will also be able to view your health information using other applications (apps) compatible with our system.

## 2025-05-03 NOTE — ED PROVIDER NOTE - PROGRESS NOTE DETAILS
[id number]    o2 sat after walking 2 rounds in the ED was 96%. pt is not hypoxic anymore. CURB 65 1. after discussing with patient, will dc patient home with augmentin and azithroymcin. pt's pharmacy is closed tomorrow. will give some to take home. return precaution given. pt agrees and understands plan. will dc

## 2025-05-03 NOTE — ED ADULT NURSE NOTE - NSFALLUNIVINTERV_ED_ALL_ED
Bed/Stretcher in lowest position, wheels locked, appropriate side rails in place/Call bell, personal items and telephone in reach/Instruct patient to call for assistance before getting out of bed/chair/stretcher/Non-slip footwear applied when patient is off stretcher/[place] to call system/Physically safe environment - no spills, clutter or unnecessary equipment/Purposeful proactive rounding/Room/bathroom lighting operational, light cord in reach

## 2025-05-03 NOTE — ED PROVIDER NOTE - OBJECTIVE STATEMENT
70 F, hx of asthma (no prior intubation), hypothyroid, p/w cough since last night. Persistent cough with white sputum production. reports sore throat from the coughing. Denied fever, chill, nasal congestion, rhinorrhea, myalgia. Denied sick contact or recent travel. Denied cp, sob, dysuria.

## 2025-05-03 NOTE — ED PROVIDER NOTE - CLINICAL SUMMARY MEDICAL DECISION MAKING FREE TEXT BOX
found to be hypoxic to 90% on RA, now placed on 2L NC. likely viral illness, such as covid/flu. will r/o PNA. although hypoxic, pt has no SOB or CP, and base on clinical presentation, low suspicion for PE. will r/o ACS. will closely monitor O2. if pt remains hypoxic on RA, likely require admission.

## 2025-05-03 NOTE — ED ADULT TRIAGE NOTE - CHIEF COMPLAINT QUOTE
pt. c/o cough with SOB and +CP x 3 days. Was given benzonatate, loratadine and albuterol without relief. ekg done.

## 2025-05-03 NOTE — ED PROVIDER NOTE - NSFOLLOWUPINSTRUCTIONS_ED_ALL_ED_FT
What is community-acquired pneumonia (CAP)? CAP is a lung infection that you get outside of a hospital or nursing home setting. Your lungs become inflamed and cannot work well. CAP may be caused by bacteria, viruses, or fungi.  The Lungs    What increases my risk for CAP?    Chronic lung disease    Cigarette smoking    Brain disorders such as stroke, dementia, and cerebral palsy    Weakened immune system    Recent surgery or trauma    Surgery for cancer of the mouth, throat, or neck    Medical conditions such as diabetes or heart disease  What are the signs and symptoms of CAP?    Cough that may bring up green, yellow, or bloody mucus    Fever, chills, or severe shaking    Shortness of breath    Breathing and heartbeat that are faster than usual    Pain in your chest or back when you breathe in or cough    Fatigue and loss of appetite    Trouble thinking clearly  How is CAP diagnosed? Your healthcare provider will listen to your lungs. You may need a chest x-ray. You may also need any of the following if you are admitted to the hospital:    CT scan pictures may show a lung infection or other problems, such as fluid around your lungs. You may be given contrast liquid to help your lungs show up better in the pictures. Tell the healthcare provider if you have ever had an allergic reaction to contrast liquid.    A pulse oximeter is a device that measures the amount of oxygen in your blood.  Pulse Oximeter      Blood and sputum tests may be done to check for the germ causing your infection.    Bronchoscopy is a procedure to look inside your airway and learn the cause of your airway or lung condition. A bronchoscope (thin tube with a light) is inserted into your mouth and moved down your throat to your airway. Tissue and fluid may be collected from your airway or lungs to be tested.    Nucleic acid-based testing , also called a PCR test, may be used to check for a virus causing your pneumonia. You may need the test if you have severe CAP or a weakened immune system.  How is CAP treated? Treatment will depend on the type of germ causing your CAP, and how bad your symptoms are. You may need antibiotics for at least 5 days if your pneumonia is caused by bacteria. Antiviral medicines may be given if you have viral pneumonia. You may need medicines that dilate your bronchial tubes. You may need oxygen if your blood oxygen level is lower than it should be. You may need to be admitted to the hospital if your pneumonia is severe.    What can I do to manage CAP?    Get plenty of rest. Rest helps your body heal.    Do not smoke or allow others to smoke around you. Nicotine and other chemicals in cigarettes and cigars can cause lung damage. Ask your healthcare provider for information if you currently smoke and need help to quit. E-cigarettes or smokeless tobacco still contain nicotine. Talk to your healthcare provider before you use these products.    Breathe warm, moist air. This helps loosen mucus. Loosely place a warm, wet washcloth over your nose and mouth. A room humidifier may also help make the air moist.    Gently tap your chest. This helps loosen mucus so it is easier to cough up.    Take deep breaths and cough. Deep breathing helps open the air passages in your lungs. Coughing helps bring up mucus from your lungs. Take a deep breath and hold the breath as long as you can. Then push the air out of your lungs with a deep, strong cough. Spit out any mucus you have coughed up. Take 10 deep breaths in a row every hour that you are awake. Remember to follow each deep breath with a cough.    Drink liquids as directed. Ask your healthcare provider how much liquid to drink each day and which liquids to drink. Liquids help make mucus thin and easier to get out of your body.  How can I prevent CAP?    Wash your hands often. Use soap for at least 20 seconds. Rinse with warm running water. Dry your hands with a clean towel or paper towel. Use hand  that contains alcohol if soap and water are not available. Wash your hands several times each day. Wash after you use the bathroom, change a child's diaper, and before you prepare or eat food. Do not touch your eyes, nose, or mouth without washing your hands first.  Handwashing      Cover a sneeze or cough. Use a tissue that covers your mouth and nose. Throw the tissue away in a trash can right away. Use the bend of your arm if a tissue is not available. Wash your hands well with soap and water or use a hand . Do not stand close to anyone who is sneezing or coughing.    Clean surfaces often. Clean doorknobs, countertops, cell phones, and other surfaces that are touched often. Use a disinfecting wipe, a single-use sponge, or a cloth you can wash and reuse. Use disinfecting  if you do not have wipes. You can create a disinfecting  by mixing 1 part bleach with 10 parts water.    Try to avoid people who have a cold or the flu. If you are sick, stay away from others as much as possible.    Ask about vaccines you may need. A pneumonia vaccine can help lower your risk for pneumonia. The vaccine may be recommended every 5 years, starting at age 65. Vaccines help lower the risk for infections that can become serious for a person who has pneumonia. Get a flu vaccine each year as soon as recommended, usually in September or October. Get a COVID-19 vaccine and booster as directed. Your healthcare provider can tell you if you should also get other vaccines, and when to get them.    When should I seek immediate care?    You are confused and cannot think clearly.    You have increased trouble breathing.    Your lips or fingernails turn gray or blue.  When should I call my doctor?    Your symptoms do not get better, or get worse.    You are urinating less, or not at all.    You have questions or concerns about your condition or care.

## 2025-05-03 NOTE — ED PROVIDER NOTE - PHYSICAL EXAMINATION
VITAL SIGNS: I have reviewed nursing notes and confirm.  CONSTITUTIONAL: Well appearing, in no acute distress.   SKIN:  warm and dry, no acute rash.   HEAD:  normocephalic, atraumatic.  EYES: EOM intact; conjunctiva and sclera clear.  ENT: No nasal discharge; airway clear.   NECK: Supple.  CARD: S1, S2, Regular rate and rhythm.   RESP:  Clear to auscultation b/l, no wheezes, rales or rhonchi. speaking in complete sentence. no accessory muscle.   ABD: Normal bowel sounds; soft; non-distended; non-tender; no guarding/ rebound.  EXT: Normal ROM. No peripheral edema. Pulses intact and equal b/l.  NEURO: Alert, oriented, grossly unremarkable

## 2025-05-07 DIAGNOSIS — J18.9 PNEUMONIA, UNSPECIFIED ORGANISM: ICD-10-CM

## 2025-05-07 DIAGNOSIS — J45.909 UNSPECIFIED ASTHMA, UNCOMPLICATED: ICD-10-CM

## 2025-05-07 DIAGNOSIS — R05.1 ACUTE COUGH: ICD-10-CM

## 2025-05-07 DIAGNOSIS — E03.9 HYPOTHYROIDISM, UNSPECIFIED: ICD-10-CM

## 2025-05-09 LAB
CULTURE RESULTS: SIGNIFICANT CHANGE UP
CULTURE RESULTS: SIGNIFICANT CHANGE UP
SPECIMEN SOURCE: SIGNIFICANT CHANGE UP
SPECIMEN SOURCE: SIGNIFICANT CHANGE UP

## 2025-05-10 ENCOUNTER — EMERGENCY (EMERGENCY)
Facility: HOSPITAL | Age: 70
LOS: 1 days | End: 2025-05-10
Attending: EMERGENCY MEDICINE | Admitting: EMERGENCY MEDICINE
Payer: MEDICARE

## 2025-05-10 VITALS
HEIGHT: 62 IN | HEART RATE: 78 BPM | SYSTOLIC BLOOD PRESSURE: 169 MMHG | RESPIRATION RATE: 18 BRPM | DIASTOLIC BLOOD PRESSURE: 73 MMHG | WEIGHT: 145.06 LBS | TEMPERATURE: 98 F | OXYGEN SATURATION: 98 %

## 2025-05-10 LAB
ALBUMIN SERPL ELPH-MCNC: 3.9 G/DL — SIGNIFICANT CHANGE UP (ref 3.3–5)
ALP SERPL-CCNC: 88 U/L — SIGNIFICANT CHANGE UP (ref 40–120)
ALT FLD-CCNC: 23 U/L — SIGNIFICANT CHANGE UP (ref 10–45)
ANION GAP SERPL CALC-SCNC: 11 MMOL/L — SIGNIFICANT CHANGE UP (ref 5–17)
APTT BLD: 28.2 SEC — SIGNIFICANT CHANGE UP (ref 26.1–36.8)
AST SERPL-CCNC: 23 U/L — SIGNIFICANT CHANGE UP (ref 10–40)
BASOPHILS # BLD AUTO: 0.11 K/UL — SIGNIFICANT CHANGE UP (ref 0–0.2)
BASOPHILS NFR BLD AUTO: 2.6 % — HIGH (ref 0–2)
BILIRUB SERPL-MCNC: 0.2 MG/DL — SIGNIFICANT CHANGE UP (ref 0.2–1.2)
BUN SERPL-MCNC: 27 MG/DL — HIGH (ref 7–23)
CALCIUM SERPL-MCNC: 9.3 MG/DL — SIGNIFICANT CHANGE UP (ref 8.4–10.5)
CHLORIDE SERPL-SCNC: 102 MMOL/L — SIGNIFICANT CHANGE UP (ref 96–108)
CO2 SERPL-SCNC: 24 MMOL/L — SIGNIFICANT CHANGE UP (ref 22–31)
CREAT SERPL-MCNC: 0.85 MG/DL — SIGNIFICANT CHANGE UP (ref 0.5–1.3)
EGFR: 74 ML/MIN/1.73M2 — SIGNIFICANT CHANGE UP
EGFR: 74 ML/MIN/1.73M2 — SIGNIFICANT CHANGE UP
EOSINOPHIL # BLD AUTO: 0.11 K/UL — SIGNIFICANT CHANGE UP (ref 0–0.5)
EOSINOPHIL NFR BLD AUTO: 2.6 % — SIGNIFICANT CHANGE UP (ref 0–6)
GLUCOSE SERPL-MCNC: 154 MG/DL — HIGH (ref 70–99)
HCT VFR BLD CALC: 33.2 % — LOW (ref 34.5–45)
HGB BLD-MCNC: 11.4 G/DL — LOW (ref 11.5–15.5)
INR BLD: 1.01 — SIGNIFICANT CHANGE UP (ref 0.85–1.16)
LYMPHOCYTES # BLD AUTO: 2.73 K/UL — SIGNIFICANT CHANGE UP (ref 1–3.3)
LYMPHOCYTES # BLD AUTO: 62.3 % — HIGH (ref 13–44)
MAGNESIUM SERPL-MCNC: 2 MG/DL — SIGNIFICANT CHANGE UP (ref 1.6–2.6)
MCHC RBC-ENTMCNC: 29.3 PG — SIGNIFICANT CHANGE UP (ref 27–34)
MCHC RBC-ENTMCNC: 34.3 G/DL — SIGNIFICANT CHANGE UP (ref 32–36)
MCV RBC AUTO: 85.3 FL — SIGNIFICANT CHANGE UP (ref 80–100)
MONOCYTES # BLD AUTO: 0.62 K/UL — SIGNIFICANT CHANGE UP (ref 0–0.9)
MONOCYTES NFR BLD AUTO: 14.1 % — HIGH (ref 2–14)
NEUTROPHILS # BLD AUTO: 0.81 K/UL — LOW (ref 1.8–7.4)
NEUTROPHILS NFR BLD AUTO: 18.4 % — LOW (ref 43–77)
PLATELET # BLD AUTO: 263 K/UL — SIGNIFICANT CHANGE UP (ref 150–400)
POTASSIUM SERPL-MCNC: 3.7 MMOL/L — SIGNIFICANT CHANGE UP (ref 3.5–5.3)
POTASSIUM SERPL-SCNC: 3.7 MMOL/L — SIGNIFICANT CHANGE UP (ref 3.5–5.3)
PROT SERPL-MCNC: 7.8 G/DL — SIGNIFICANT CHANGE UP (ref 6–8.3)
PROTHROM AB SERPL-ACNC: 11.8 SEC — SIGNIFICANT CHANGE UP (ref 9.9–13.4)
RBC # BLD: 3.89 M/UL — SIGNIFICANT CHANGE UP (ref 3.8–5.2)
RBC # FLD: 13.2 % — SIGNIFICANT CHANGE UP (ref 10.3–14.5)
SODIUM SERPL-SCNC: 137 MMOL/L — SIGNIFICANT CHANGE UP (ref 135–145)
TROPONIN T, HIGH SENSITIVITY RESULT: 8 NG/L — SIGNIFICANT CHANGE UP (ref 0–51)
WBC # BLD: 4.39 K/UL — SIGNIFICANT CHANGE UP (ref 3.8–10.5)
WBC # FLD AUTO: 4.39 K/UL — SIGNIFICANT CHANGE UP (ref 3.8–10.5)

## 2025-05-10 PROCEDURE — 74177 CT ABD & PELVIS W/CONTRAST: CPT | Mod: 26

## 2025-05-10 PROCEDURE — 70498 CT ANGIOGRAPHY NECK: CPT | Mod: 26

## 2025-05-10 PROCEDURE — 99281 EMR DPT VST MAYX REQ PHY/QHP: CPT

## 2025-05-10 PROCEDURE — 70450 CT HEAD/BRAIN W/O DYE: CPT | Mod: 26

## 2025-05-10 PROCEDURE — 99291 CRITICAL CARE FIRST HOUR: CPT

## 2025-05-10 PROCEDURE — 0042T: CPT

## 2025-05-10 PROCEDURE — 93010 ELECTROCARDIOGRAM REPORT: CPT

## 2025-05-10 PROCEDURE — 70496 CT ANGIOGRAPHY HEAD: CPT | Mod: 26

## 2025-05-10 RX ADMIN — Medication 1000 MILLILITER(S): at 21:52

## 2025-05-10 NOTE — ED PROVIDER NOTE - PROGRESS NOTE DETAILS
[place]: Received s/o pending rpt CTH, CT a/p results then likely medicine admission.   CT a/p showed "IMPRESSION: No bowel obstruction. No colitis or diverticulitis. Degenerative changes in the lumbar spine resulting in severe canal stenosis L3/L4 and L4/L5. Consider follow-up nonemergent lumbar spine MRI to better characterize."  CTH showed "IMPRESSION: Stable appearance of 1.1 cm paramedian left cerebellar hyperdensity   without expansion or edema again favoring cavernoma."  Rediscussed w/ neurosurg - recommending non-urgent MRI, outpt f/u w/ Dr. Omega Garnica from their end.  Rediscussed w/ stroke team - recommending MRI if pt getting admitted anyways.   D/w daughter at bedside (prefers daughter to translate). Pt feeling much better, no diarrhea while in ED, tolerating PO. Offered admission for MRI/supporitve care, pt prefers outpt f/u.   Discussed all results with patient, including any incidental radiological findings and lab abnormalities. Given copy of results and instructed to bring copy to primary doctor.  Steady gait.

## 2025-05-10 NOTE — STROKE CODE NOTE - NIH STROKE SCALE: 1A. LEVEL OF CONSCIOUSNESS, QM
I have personally seen and examined the patient. I have collaborated with and supervised the (0) Alert; keenly responsive

## 2025-05-10 NOTE — ED PROVIDER NOTE - CLINICAL SUMMARY MEDICAL DECISION MAKING FREE TEXT BOX
[facility] [place] [facility] – [place], [place]  [id number]  1978      MANOMETRY DISCHARGE INSTRUCTION    You may resume your regular diet as tolerated. You may resume your normal daily activities  Call your Physician if you have any complications or questions. MyChart Activation    Thank you for requesting access to [employer]. Please follow the instructions below to securely access and download your online medical record. [employer] allows you to send messages to your doctor, view your test results, renew your prescriptions, schedule appointments, and more. How Do I Sign Up? 1. In your internet browser, go to [email or web address]  2. Click on the First Time User? Click Here link in the Sign In box. You will be redirect to the New Member Sign Up page. 3. Enter your [employer] Access Code exactly as it appears below. You will not need to use this code after youve completed the sign-up process. If you do not sign up before the expiration date, you must request a new code. [employer] Access Code: [email or web address]  Expires: 2018  1:35 PM (This is the date your [employer] access code will )    4. Enter the last four digits of your Social Security Number (xxxx) and Date of Birth (mm/dd/yyyy) as indicated and click Submit. You will be taken to the next sign-up page. 5. Create a [employer] ID. This will be your [employer] login ID and cannot be changed, so think of one that is secure and easy to remember. 6. Create a [employer] password. You can change your password at any time. 7. Enter your Password Reset Question and Answer. This can be used at a later time if you forget your password. 8. Enter your e-mail address. You will receive e-mail notification when new information is available in [address] 9. Click Sign Up. You can now view and download portions of your medical record. 10. Click the Download Summary menu link to download a portable copy of your medical information.     Additional Information    If you have questions, please visit the Frequently Asked Questions section of the [employer] website at [email or web address] Remember, [employer] is NOT to be used for urgent needs. For medical emergencies, dial 911. 69 yo F PMH mild cognitive impairment, gastritis brought by daughter primarily for abd pain, bloating and diarrhea that started 5/8 but also reports a tremor and weakness in the left arm since 5pm.  Patient was dx 5/3 with rhinovirus and was put on abx.  On 5/8 started with diffuse pain and diarrhea.  No blood in stool. 5/8, 5/9 large amount of diarrhea; only once today.  Also with vomiting, but not today.  Not able to eat much today due to nausea, pain and bloating.  At 5pm, daughter noted a tremor in her left arm which patient states is weak and numb.  No change in vision, speech.  When she was walking in the ER she became generally weak and needed support but did not pass out.      In light of symptoms of tremor, weakness and numbness and objective mild weakness on exam, code stroke called.   Will also image abdomen/pelvis. 69 yo F PMH mild cognitive impairment, gastritis brought by daughter primarily for abd pain, bloating and diarrhea that started 5/8 but also reports a tremor and weakness in the left arm since 5pm.  Patient was dx 5/3 with rhinovirus and was put on abx.  On 5/8 started with diffuse pain and diarrhea.  No blood in stool. 5/8, 5/9 large amount of diarrhea; only once today.  Also with vomiting, but not today.  Not able to eat much today due to nausea, pain and bloating.  At 5pm, daughter noted a tremor in her left arm which patient states is weak and numb.  No change in vision, speech.  When she was walking in the ER she became generally weak and needed support but did not pass out.      In light of symptoms of tremor, weakness and numbness and objective mild weakness on exam, code stroke called.   Will also image abdomen/pelvis.    21:40: Neurosurg informed of CBL finding and will see her now 69 yo F PMH mild cognitive impairment, gastritis brought by daughter primarily for abd pain, bloating and diarrhea that started 5/8 but also reports a tremor and weakness in the left arm since 5pm.  Patient was dx 5/3 with rhinovirus and was put on abx.  On 5/8 started with diffuse pain and diarrhea.  No blood in stool. 5/8, 5/9 large amount of diarrhea; only once today.  Also with vomiting, but not today.  Not able to eat much today due to nausea, pain and bloating.  At 5pm, daughter noted a tremor in her left arm which patient states is weak and numb.  No change in vision, speech.  When she was walking in the ER she became generally weak and needed support but did not pass out.      In light of symptoms of tremor, weakness and numbness and objective mild weakness on exam, code stroke called.   Will also image abdomen/pelvis.    21:40: Neurosurg informed of CBL finding and will see her now    22:40:  Neurosurg rec 4 hr CT.  If remains questionable bleed/small bleed rec admit to medicine if stroke team does not admit.     23:00: Patient's care signed over to night MD and PA.  Patient is pending results, re-evaluation and disposition.  Any care or intervention after sign out is the responsibility of the night team and the PA is supervised by the night MD. 71 yo F PMH mild cognitive impairment, gastritis brought by daughter primarily for abd pain, bloating and diarrhea that started 5/8 but also reports a tremor and weakness in the left arm since 5pm.  Patient was dx 5/3 with rhinovirus and was put on abx.  On 5/8 started with diffuse pain and diarrhea.  No blood in stool. 5/8, 5/9 large amount of diarrhea; only once today.  Also with vomiting, but not today.  Not able to eat much today due to nausea, pain and bloating.  At 5pm, daughter noted a tremor in her left arm which patient states is weak and numb.  No change in vision, speech.  When she was walking in the ER she became generally weak and needed support but did not pass out.      In light of symptoms of tremor, weakness and numbness and objective mild weakness on exam, code stroke called.   Will also image abdomen/pelvis.    21:40: Neurosurg informed of CBL finding and will see her now    22:40:  Neurosurg rec 4 hr CT.  If remains questionable bleed/small bleed rec admit to medicine if stroke team does not admit.     23:00: Patient's care signed over to night MD.  Patient is pending results, re-evaluation and disposition.  Any care or intervention after sign out is the responsibility of the night MD.

## 2025-05-10 NOTE — ED ADULT TRIAGE NOTE - CHIEF COMPLAINT QUOTE
abdominal with diarrhea/ nausea since 5/08/25 after done with her antibiotic, was here 05/03 discharged with antibiotics

## 2025-05-10 NOTE — ED ADULT NURSE NOTE - NSFALLRISKFACTORS_ED_ALL_ED
Preoperative Assessment  Patient presents today for preoperative assessment for preoperative assessment by Dr. Tito Mosquera for a scheduled Cataract surgery of right eye on 03/22/24.   She has underwent previous anesthesia and tolerated well. She has not cardiac issues, other than a heart murmur diagnosed 8 years ago; echocardiogram has been scheduled on 03/07/24, recommend follow up for reassurance. Patient's exercise tolerance is within normal limits. No comorbidities relevant to the surgery. Denies any chest pain, shortness of breath, abdominal pain, nausea, vomiting, diarrhea, or constipation.    Previous surgeries include:   Past Surgical History:   Procedure Laterality Date    ABDOMINAL EXPLORATION SURGERY      Dr Simpson (GYN)    COLONOSCOPY  08/21/2015    COLONOSCOPY  01/2021    TONSILLECTOMY      WRIST SURGERY  age 30    right fixation after a fall        Physical Exam  Constitutional:       Appearance: Normal appearance.   HENT:      Head: Normocephalic and atraumatic.      Nose: Nose normal.      Mouth/Throat:      Mouth: Mucous membranes are moist.      Pharynx: Oropharynx is clear.   Eyes:      Pupils: Pupils are equal, round, and reactive to light.   Cardiovascular:      Rate and Rhythm: Normal rate and regular rhythm.      Pulses: Normal pulses.      Heart sounds: Normal heart sounds.   Pulmonary:      Effort: Pulmonary effort is normal.      Breath sounds: Normal breath sounds.   Abdominal:      General: Abdomen is flat. Bowel sounds are normal.   Genitourinary:     General: Normal vulva.   Musculoskeletal:         General: Normal range of motion.      Cervical back: Normal range of motion.   Skin:     General: Skin is warm.      Capillary Refill: Capillary refill takes less than 2 seconds.   Neurological:      General: No focal deficit present.      Mental Status: She is alert and oriented to person, place, and time.   Psychiatric:         Mood and Affect: Mood normal.          1. Preop  No indicators present

## 2025-05-10 NOTE — ED PROVIDER NOTE - PHYSICAL EXAMINATION
General:  Well appearing, no distress  HEENT:  No conjunctival injection, neck supple, no congestion   Chest:  Non-tender, no crepitance  Lungs:  Clear to auscultation bilaterally   Heart:  s1s2 normal, no murmur  Abdomen:  Distended, diffuse moderate tenderness without guarding or rebound   :  Deferred  Rectal:  Deferred  Extremities: No edema, normal perfusion, no joint swelling or tenderness  Neuro:  Alert, oriented, no facial droop, slight drift LUE, both arms with mild tremor, mild weakness LUE, no LE drift.  Remainder of NIHSS by stroke team

## 2025-05-10 NOTE — ED PROVIDER NOTE - NSFOLLOWUPINSTRUCTIONS_ED_ALL_ED_FT
Can take tylenol 650mg every 6hrs as needed for pain.    Stay well hydrated.     Follow up with your primary doctor within 1-2 days.     Follow up with gastroenterologist for persistent symptoms. Can call [phone] to schedule appointment    Follow up with spine specialist for CT findings.   Can call [phone] ([phone]) to schedule appointment or go online [email or web address]    Follow up with general neurologist. Can call [phone] to schedule appointment.     Follow up with neurosurgeon as soon as possible.       SEEK IMMEDIATE MEDICAL CARE IF YOU HAVE ANY OF THE FOLLOWING SYMPTOMS: worsening abdominal pain, uncontrollable vomiting, profuse diarrhea, inability to have bowel movements or pass gas, black or bloody stools, fever accompanying chest pain or back pain, or fainting. These symptoms may represent a serious problem that is an emergency. Do not wait to see if the symptoms will go away. Get medical help right away. Call [phone] and do not drive yourself to the hospital.    Abdominal Pain, Adult    Abdominal pain can be caused by many things. Often, abdominal pain is not serious and it gets better with no treatment or by being treated at home. However, sometimes abdominal pain is serious. Your health care provider will do a medical history and a physical exam to try to determine the cause of your abdominal pain.    Follow these instructions at home:  Take over-the-counter and prescription medicines only as told by your health care provider. Do not take a laxative unless told by your health care provider.  Drink enough fluid to keep your urine clear or pale yellow.  Watch your condition for any changes.  Keep all follow-up visits as told by your health care provider. This is important.    Contact a health care provider if:  Your abdominal pain changes or gets worse.  You are not hungry or you lose weight without trying.  You are constipated or have diarrhea for more than 2–3 days.  You have pain when you urinate or have a bowel movement.  Your abdominal pain wakes you up at night.  Your pain gets worse with meals, after eating, or with certain foods.  You are throwing up and cannot keep anything down.  You have a fever.    Get help right away if:  Your pain does not go away as soon as your health care provider told you to expect.  You cannot stop throwing up.  Your pain is only in areas of the abdomen, such as the right side or the left lower portion of the abdomen.  You have bloody or black stools, or stools that look like tar.  You have severe pain, cramping, or bloating in your abdomen.  You have signs of dehydration, such as:  Dark urine, very little urine, or no urine.  Cracked lips.  Dry mouth.  Sunken eyes.  Sleepiness.  Weakness.     Diarrhea    Diarrhea is frequent loose or watery bowel movements that has many causes. Diarrhea can make you feel weak and cause you to become dehydrated. Diarrhea typically lasts 2–3 days, but can last longer if it is a sign of something more serious. Drink clear fluids to prevent dehydration. Eat bland, easy-to-digest foods as tolerated.     SEEK IMMEDIATE MEDICAL CARE IF YOU HAVE ANY OF THE FOLLOWING SYMPTOMS: high fevers, lightheadedness/dizziness, chest pain, black or bloody stools, shortness of breath, severe abdominal or back pain, or any signs of dehydration.

## 2025-05-10 NOTE — ED ADULT NURSE NOTE - NSFALLUNIVINTERV_ED_ALL_ED
Bed/Stretcher in lowest position, wheels locked, appropriate side rails in place/Call bell, personal items and telephone in reach/Instruct patient to call for assistance before getting out of bed/chair/stretcher/Non-slip footwear applied when patient is off stretcher/[place] to call system/Physically safe environment - no spills, clutter or unnecessary equipment/Purposeful proactive rounding/Room/bathroom lighting operational, light cord in reach

## 2025-05-10 NOTE — ED PROVIDER NOTE - CARE PROVIDER_API CALL
Carrol Tuttle  Neurosurgery  [address] [place] [address]  Phone: [phone]  Fax: [phone]  Follow Up Time:

## 2025-05-10 NOTE — ED ADULT NURSE NOTE - OBJECTIVE STATEMENT
Received patient ambulatory accompanied by  with chief complaint of abdominal pain, nausea and vomiting. Patient was also observed to have tremors and LUE weakness/numbness started 5pm this afternoon as per daughter. Stroke code activated as ordered by MD Kidd.  On PE, AOX3, speaking full sentences without difficulty. Patient not in active cardiac or respiratory distress. No obvious trauma/injury/deformity noted. Patient oriented to ED area. All needs attended. POC reviewed. Placed on continous cardiac monitoring. Fall risk precautions maintained. Purposeful proactive hourly rounding in progress.

## 2025-05-10 NOTE — ED PROVIDER NOTE - NS ED ATTENDING STATEMENT MOD
This was a shared visit with the MAYTE. I reviewed and verified the documentation. I have personally provided the amount of critical care time documented below excluding time spent on separate procedures.

## 2025-05-10 NOTE — ED PROVIDER NOTE - PATIENT PORTAL LINK FT
You can access the FollowMyHealth Patient Portal offered by [facility] by registering at the following website: [email or web address]. By joining [employer]’s FollowMyHealth portal, you will also be able to view your health information using other applications (apps) compatible with our system.

## 2025-05-10 NOTE — ED PROVIDER NOTE - OBJECTIVE STATEMENT
71 yo F PMH mild cognitive impairment, gastritis brought by daughter primarily for abd pain, bloating and diarrhea that started 5/8 but also reports a tremor and weakness in the left arm since 5pm.  Patient was dx 5/3 with rhinovirus and was put on abx.  On 5/8 started with diffuse pain and diarrhea.  No blood in stool. 5/8, 5/9 large amount of diarrhea; only once today.  Also with vomiting, but not today.  Not able to eat much today due to nausea, pain and bloating.  At 5pm, daughter noted a tremor in her left arm which patient states is weak and numb.  No change in vision, speech.  When she was walking in the ER she became generally weak and needed support but did not pass out.      In light of symptoms of tremor, weakness and numbness and objective mild weakness on exam, code stroke called.

## 2025-05-10 NOTE — ED PROVIDER NOTE - NSICDXPASTMEDICALHX_GEN_ALL_CORE_FT
PAST MEDICAL HISTORY:  Asthma Asthma    Cholelithiasis Gallstones    Gastroesophageal reflux disease GERD (gastroesophageal reflux disease)    Hypothyroidism Hypothyroid

## 2025-05-11 VITALS
OXYGEN SATURATION: 98 % | HEART RATE: 78 BPM | RESPIRATION RATE: 19 BRPM | SYSTOLIC BLOOD PRESSURE: 145 MMHG | TEMPERATURE: 98 F | DIASTOLIC BLOOD PRESSURE: 75 MMHG

## 2025-05-11 PROCEDURE — 70498 CT ANGIOGRAPHY NECK: CPT

## 2025-05-11 PROCEDURE — 70496 CT ANGIOGRAPHY HEAD: CPT

## 2025-05-11 PROCEDURE — 36000 PLACE NEEDLE IN VEIN: CPT | Mod: XU

## 2025-05-11 PROCEDURE — 85025 COMPLETE CBC W/AUTO DIFF WBC: CPT

## 2025-05-11 PROCEDURE — 70450 CT HEAD/BRAIN W/O DYE: CPT | Mod: 26

## 2025-05-11 PROCEDURE — 84484 ASSAY OF TROPONIN QUANT: CPT

## 2025-05-11 PROCEDURE — 0042T: CPT

## 2025-05-11 PROCEDURE — 93005 ELECTROCARDIOGRAM TRACING: CPT

## 2025-05-11 PROCEDURE — 99291 CRITICAL CARE FIRST HOUR: CPT | Mod: 25

## 2025-05-11 PROCEDURE — 74177 CT ABD & PELVIS W/CONTRAST: CPT

## 2025-05-11 PROCEDURE — 36415 COLL VENOUS BLD VENIPUNCTURE: CPT

## 2025-05-11 PROCEDURE — 83735 ASSAY OF MAGNESIUM: CPT

## 2025-05-11 PROCEDURE — 85610 PROTHROMBIN TIME: CPT

## 2025-05-11 PROCEDURE — 80053 COMPREHEN METABOLIC PANEL: CPT

## 2025-05-11 PROCEDURE — 85730 THROMBOPLASTIN TIME PARTIAL: CPT

## 2025-05-11 PROCEDURE — 70450 CT HEAD/BRAIN W/O DYE: CPT

## 2025-05-11 NOTE — CONSULT NOTE ADULT - SUBJECTIVE AND OBJECTIVE BOX
HISTORY OF PRESENT ILLNESS: 69 yo female PMH dementia x 7 yrs, gastritis, presents to ED for abdominal pain, bloating, diarrhea that began 5/8 after a course of antibiotics that were prescribed by the ED on 5/3 for rhinovirus (?), but today developed new left upper extremity tremor and weakness since 5pm today, which prompted a stroke code in ED. Per daughter at bedside, patient has had an acute cognitive decline today, she is more confused and repeats herself. Endorses double vision and b/l hand numbness x 1 week. Denies any headaches or vomiting. Denies any anticoagulation.     PAST MEDICAL & SURGICAL HISTORY:  Asthma  Asthma      Hypothyroidism  Hypothyroid      Cholelithiasis  Gallstones      Gastroesophageal reflux disease  GERD (gastroesophageal reflux disease)      Other postprocedural status  S/P thyroidectomy        FAMILY HISTORY:      SOCIAL HISTORY:  Tobacco Use:  EtOH use:   Substance:    Allergies    No Known Allergies    Intolerances        REVIEW OF SYSTEMS      General:	no recent illnesses, no recent wt gain/loss    Skin/Breast:  intact  	  Ophthalmologic:  negative, glasses for ***  	  ENMT:	negative    Respiratory and Thorax: no coughing, wheezing, recent URI  	  Cardiovascular: no chest pain, HERNANDEZ    Gastrointestinal:	soft, non tender    Genitourinary: no frequency, dysuria    Musculoskeletal:	negative    Neurological:	see HPI    Psychiatric:	negative    Hematology/Lymphatics:	negative    Endocrine:  	negative    Allergic/Immunologic:  Negative      MEDICATIONS:  Antibiotics:    Neuro:    Anticoagulation:    OTHER:    IVF:      Vital Signs Last 24 Hrs  T(C): 36.5 (10 May 2025 20:40), Max: 36.5 (10 May 2025 20:40)  T(F): 97.7 (10 May 2025 20:40), Max: 97.7 (10 May 2025 20:40)  HR: 78 (10 May 2025 20:40) (78 - 78)  BP: 169/73 (10 May 2025 20:40) (169/73 - 169/73)  BP(mean): --  RR: 18 (10 May 2025 20:40) (18 - 18)  SpO2: 98% (10 May 2025 20:40) (98% - 98%)    Parameters below as of 10 May 2025 20:40  Patient On (Oxygen Delivery Method): room air        PHYSICAL EXAM:  Constitutional: awake, alert, sitting up in bed. NAD.   Respiratory: non-labored breathing. Normal chest rise.   Cardiovascular: Regular rate and rhythm  Gastrointestinal:  Soft, nontender, nondistended.  .  Vascular: Extremities warm, no ulcers, no discoloration of skin.   Neurological: Gen: AA&O x 3, conversant, appropriate.      CN II-XII grossly intact.    Motor: WEST x 4, 5/5 throughout UE/LE.    Sens: Subjective b/l hand numbness otherwise sensation intact to light touch throughout.    Extremities: warm and well perfused     No pronator drift, no dysmetria.  Wound/incision:     LABS:                        11.4   4.39  )-----------( 263      ( 10 May 2025 21:19 )             33.2     05-10    137  |  102  |  27[H]  ----------------------------<  154[H]  3.7   |  24  |  0.85    Ca    9.3      10 May 2025 21:19  Mg     2.0     05-10    TPro  7.8  /  Alb  3.9  /  TBili  0.2  /  DBili  x   /  AST  23  /  ALT  23  /  AlkPhos  88  05-10    PT/INR - ( 10 May 2025 21:19 )   PT: 11.8 sec;   INR: 1.01          PTT - ( 10 May 2025 21:19 )  PTT:28.2 sec  Urinalysis Basic - ( 10 May 2025 21:19 )    Color: x / Appearance: x / SG: x / pH: x  Gluc: 154 mg/dL / Ketone: x  / Bili: x / Urobili: x   Blood: x / Protein: x / Nitrite: x   Leuk Esterase: x / RBC: x / WBC x   Sq Epi: x / Non Sq Epi: x / Bacteria: x      CULTURES:  Culture Results:   No growth at 5 days (05-03 @ 14:28)  Culture Results:   No growth at 5 days (05-03 @ 14:28)      RADIOLOGY & ADDITIONAL STUDIES:    Assessment: 69 yo female PMH dementia x 7 yrs, gastritis, presents to ED for abdominal pain, bloating, diarrhea that began 5/8 after a course of antibiotics that were prescribed by the ED on 5/3 for rhinovirus (?), but today developed new left upper extremity tremor and weakness since 5pm today, which prompted a stroke code in ED. Per daughter at bedside, patient has had an acute cognitive decline today, she is more confused and repeats herself. Endorses double vision and b/l hand numbness x 1 week. Denies any headaches or vomiting. Denies any anticoagulation. CTH shows small left cerebellar hyperdensity, otherwise stroke code unremarkable.       Plan:  - Recommend stability CTH in 4 hours  - Recommend MRI brain w/wo contrast to assess for any underlying abnormalities, can be done as outpatient.   - Recommend stroke neurology consult   - Please follow up with Dr. Tuttle outpatient. Call [phone] to schedule an appointment.    Discussed w/ Dr. Tuttle  
 **STROKE CODE CONSULT NOTE**    Last known well time/Time of onset of symptoms: 5/10 at 5PM     HPI: 69 y/o F with PMHx of Asthma, DM2, hypothyroidism, PUD presents to [facility] ED with abd pain, diarrhea, "bloating," generalized weakness, and b/l upper extremities tremor. Pt's daughter at bedside reported that she finished a course of ABX yesterday due to being dx with ?rhinovirus. However, at dinner pt was c/o abd pain and generalized weakness. Daughter noted unsteady gait and L arm weakness that began at dinner today, 5/10 at 5PM- which prompted her to bring her to the ED and seek medical attention. LKW 5/10 at 5PM. NIHSS 3 for L facial droop and LUE drift. Pt was also noted to have unsteady gait with leaning towards her L side. BP after neurological assessment was 185/79 mmHg. Stroke code HCT showed 1.1cm hyperdensity in the L cerebellum without surrounding edema that may represent a ?cavernoma. CTA H/N showed no significant stenosis or occlusion. CTP showed no perfusion mismatch. No acute intervention was offered due to hyperdensity seen on CTH and low suspicion of LVO. Repeat 4-hr CTH showed stable appearance of 1.1 cm paramedian left cerebellar hyperdensity without expansion or edema again favoring cavernoma.     T(C): 36.7 (05-10-25 @ 22:20), Max: 36.7 (05-10-25 @ 22:20)  HR: 76 (05-10-25 @ 23:30) (71 - 78)  BP: 159/73 (05-10-25 @ 23:30) (159/73 - 169/73)  RR: 18 (05-10-25 @ 23:30) (18 - 18)  SpO2: 97% (05-10-25 @ 23:30) (97% - 98%)    PAST MEDICAL & SURGICAL HISTORY:  Asthma  Asthma      Hypothyroidism  Hypothyroid      Cholelithiasis  Gallstones      Gastroesophageal reflux disease  GERD (gastroesophageal reflux disease)      Other postprocedural status  S/P thyroidectomy          FAMILY HISTORY:        ROS:   Constitutional: No fever, weight loss or fatigue  Eyes: No eye pain, visual disturbances, or discharge  ENMT:  No difficulty hearing, tinnitus; No sinus or throat pain  Neck: No pain or stiffness  Respiratory: No cough, wheezing, chills or hemoptysis  Cardiovascular: No chest pain, palpitations, shortness of breath, or leg swelling  Gastrointestinal: (+) abd pain   Genitourinary: No dysuria, frequency, hematuria or incontinence  Neurological: As per HPI  Skin: No itching, burning, rashes or lesions   Endocrine: No heat or cold intolerance; No hair loss  Musculoskeletal: No joint pain or swelling; No muscle, back or extremity pain  Heme/Lymph: No easy bruising or bleeding gums    MEDICATIONS  (STANDING):    MEDICATIONS  (PRN):    Allergies    No Known Allergies    Intolerances      Vital Signs Last 24 Hrs  T(C): 36.7 (10 May 2025 22:20), Max: 36.7 (10 May 2025 22:20)  T(F): 98 (10 May 2025 22:20), Max: 98 (10 May 2025 22:20)  HR: 76 (10 May 2025 23:30) (71 - 78)  BP: 159/73 (10 May 2025 23:30) (159/73 - 169/73)  BP(mean): --  RR: 18 (10 May 2025 23:30) (18 - 18)  SpO2: 97% (10 May 2025 23:30) (97% - 98%)    Parameters below as of 10 May 2025 23:30  Patient On (Oxygen Delivery Method): room air        Physical exam:  Constitutional: No acute distress, conversant  Eyes: Anicteric sclerae, moist conjunctivae, see below for CNs  Neck: trachea midline, FROM  Pulmonary: No use of accessory muscles    Neurologic:  -Mental status: Awake, alert, oriented to person, place, and time. Speech is fluent with intact naming, repetition, and comprehension, no dysarthria. Recent and remote memory intact. Follows commands. Attention/concentration intact. Fund of knowledge appropriate.  -Cranial nerves:   II: Visual fields are full to confrontation.  III, IV, VI: Extraocular movements are intact without nystagmus. Pupils equally round and reactive to light  V:  Facial sensation V1-V3 equal and intact   VII: L facial droop   VIII: Hearing is bilaterally intact to voice   Motor: Normal bulk and tone. Weak hand  b/l (L weaker than R). Mild LUE drift that doesn't hit bed.   Sensation: Intact to light touch bilaterally. No neglect or extinction on double simultaneous testing.  Coordination: No dysmetria on finger-to-nose bilaterally but is tremulous   Gait: Unsteady gait with leaning towards L side     NIHSS: 3    Fingerstick Blood Glucose: CAPILLARY BLOOD GLUCOSE        LABS:                        11.4   4.39  )-----------( 263      ( 10 May 2025 21:19 )             33.2     05-10    137  |  102  |  27[H]  ----------------------------<  154[H]  3.7   |  24  |  0.85    Ca    9.3      10 May 2025 21:19  Mg     2.0     05-10    TPro  7.8  /  Alb  3.9  /  TBili  0.2  /  DBili  x   /  AST  23  /  ALT  23  /  AlkPhos  88  05-10    PT/INR - ( 10 May 2025 21:19 )   PT: 11.8 sec;   INR: 1.01          PTT - ( 10 May 2025 21:19 )  PTT:28.2 sec      Urinalysis Basic - ( 10 May 2025 21:19 )    Color: x / Appearance: x / SG: x / pH: x  Gluc: 154 mg/dL / Ketone: x  / Bili: x / Urobili: x   Blood: x / Protein: x / Nitrite: x   Leuk Esterase: x / RBC: x / WBC x   Sq Epi: x / Non Sq Epi: x / Bacteria: x        RADIOLOGY & ADDITIONAL STUDIES:  CTH (5/10): 1.1 cm left hyperdense region in the paramedian left cerebellar   parenchyma without surrounding edema. Finding is favored to represent a   cavernoma, however, cannot be definitive no acute blood products without   a prior for comparison. Recommend further evaluation with   contrast-enhanced brain MRI.    No vasogenic edema, hydrocephalus or extra-axial collection. Mild chronic   microvascular changes.    CT PERFUSION: No core infarct or ischemic penumbra.    CTA NECK: No vessel occlusion, stenosis or dissection.    CTA HEAD: No large vessel occlusion, proximal stenosis or aneurysm.    CTH (5/11): Stable appearance of 1.1 cm paramedian left cerebellar hyperdensity   without expansion or edema again favoring cavernoma.      -----------------------------------------------------------------------------------------------------------------  IV-tenectaplase (Y/N):    No  Reason IV-tenectaplase not given: Hyperdensity on L cerebellum seen on CTH

## 2025-05-11 NOTE — CONSULT NOTE ADULT - ASSESSMENT
69 y/o F with PMHx of Asthma, DM2, hypothyroidism, PUD presents to [facility] ED with abd pain, diarrhea, "bloating," generalized weakness, and b/l upper extremities tremor. Pt's daughter at bedside reported that she finished a course of ABX yesterday due to being dx with ?rhinovirus. However, at dinner pt was c/o abd pain and generalized weakness. Daughter noted unsteady gait and L arm weakness that began at dinner today, 5/10 at 5PM- which prompted her to bring her to the ED and seek medical attention. LKW 5/10 at 5PM. NIHSS 3 for L facial droop and LUE drift. Pt was also noted to have unsteady gait with leaning towards her L side. BP after neurological assessment was 185/79 mmHg. Stroke code HCT showed 1.1cm hyperdensity in the L cerebellum without surrounding edema that may represent a ?cavernoma. CTA H/N showed no significant stenosis or occlusion. CTP showed no perfusion mismatch. No acute intervention was offered due to hyperdensity seen on CTH and low suspicion of LVO. Repeat 4-hr CTH showed stable appearance of 1.1 cm paramedian left cerebellar hyperdensity without expansion or edema again favoring cavernoma.     - Recommend sBP <160   - Recommend obtaining MRI brain w/wo contrast to r/o cavernoma   - Recommend admission to medicine for infectious w/u     Stroke team will continue to follow.     Case discussed with Stroke Neurology Attending, Dr. Ebony Wooten.  69 y/o F with PMHx of Asthma, DM2, hypothyroidism, PUD presents to [facility] ED with abd pain, diarrhea, "bloating," generalized weakness, and b/l upper extremities tremor. Pt's daughter at bedside reported that she finished a course of ABX yesterday due to being dx with ?rhinovirus. However, at dinner pt was c/o abd pain and generalized weakness. Daughter noted unsteady gait and L arm weakness that began at dinner today, 5/10 at 5PM- which prompted her to bring her to the ED and seek medical attention. LKW 5/10 at 5PM. NIHSS 3 for L facial droop and LUE drift. Pt was also noted to have unsteady gait with leaning towards her L side. BP after neurological assessment was 185/79 mmHg. Stroke code HCT showed 1.1cm hyperdensity in the L cerebellum without surrounding edema that may represent a ?cavernoma. CTA H/N showed no significant stenosis or occlusion. CTP showed no perfusion mismatch. No acute intervention was offered due to hyperdensity seen on CTH and low suspicion of LVO. Repeat 4-hr CTH showed stable appearance of 1.1 cm paramedian left cerebellar hyperdensity without expansion or edema again favoring cavernoma.     - Recommend sBP <160   - Recommend obtaining MRI brain w/wo contrast to r/o cavernoma   - Recommend admission to medicine for infectious w/u and abd pain     Stroke team will continue to follow if pt and family are agreeable to admission as per ED provider.     Case discussed with Stroke Neurology Attending, Dr. Ebony Wooten.  69 y/o F with PMHx of Asthma, DM2, hypothyroidism, PUD presents to [facility] ED with abd pain, diarrhea, "bloating," generalized weakness, and b/l upper extremities tremor. Pt's daughter at bedside reported that she finished a course of ABX yesterday due to being dx with ?rhinovirus. However, at dinner pt was c/o abd pain and generalized weakness. Daughter noted unsteady gait and L arm weakness that began at dinner today, 5/10 at 5PM- which prompted her to bring her to the ED and seek medical attention. LKW 5/10 at 5PM. NIHSS 3 for L facial droop and LUE drift. Pt was also noted to have unsteady gait with leaning towards her L side. BP after neurological assessment was 185/79 mmHg. Stroke code HCT showed 1.1cm hyperdensity in the L cerebellum without surrounding edema that may represent a ?cavernoma. CTA H/N showed no significant stenosis or occlusion. CTP showed no perfusion mismatch. No acute intervention was offered due to hyperdensity seen on CTH and low suspicion of LVO. Repeat 4-hr CTH showed stable appearance of 1.1 cm paramedian left cerebellar hyperdensity without expansion or edema again favoring cavernoma.     - Recommend sBP <160   - Recommend obtaining MRI brain w/wo contrast to r/o cavernoma   - NSGY consulted by ED team and recommended repeat 4-hr CTH stability scan. Team also recommended for MRI w/wo to be performed outpatient   - Recommend admission to medicine for infectious w/u and abd pain     Stroke team will continue to follow if pt and family are agreeable to admission to medicine as per ED provider.     Case discussed with Stroke Neurology Attending, Dr. Ebony Wooten.

## 2025-05-14 DIAGNOSIS — E11.9 TYPE 2 DIABETES MELLITUS WITHOUT COMPLICATIONS: ICD-10-CM

## 2025-05-14 DIAGNOSIS — H53.2 DIPLOPIA: ICD-10-CM

## 2025-05-14 DIAGNOSIS — E89.0 POSTPROCEDURAL HYPOTHYROIDISM: ICD-10-CM

## 2025-05-14 DIAGNOSIS — R29.898 OTHER SYMPTOMS AND SIGNS INVOLVING THE MUSCULOSKELETAL SYSTEM: ICD-10-CM

## 2025-05-14 DIAGNOSIS — R25.1 TREMOR, UNSPECIFIED: ICD-10-CM

## 2025-05-14 DIAGNOSIS — R10.819 ABDOMINAL TENDERNESS, UNSPECIFIED SITE: ICD-10-CM

## 2025-05-14 DIAGNOSIS — Z87.19 PERSONAL HISTORY OF OTHER DISEASES OF THE DIGESTIVE SYSTEM: ICD-10-CM

## 2025-05-14 DIAGNOSIS — R19.7 DIARRHEA, UNSPECIFIED: ICD-10-CM

## 2025-05-14 DIAGNOSIS — R10.9 UNSPECIFIED ABDOMINAL PAIN: ICD-10-CM

## 2025-05-14 DIAGNOSIS — R14.0 ABDOMINAL DISTENSION (GASEOUS): ICD-10-CM

## 2025-05-14 DIAGNOSIS — J45.909 UNSPECIFIED ASTHMA, UNCOMPLICATED: ICD-10-CM

## 2025-05-14 DIAGNOSIS — R11.2 NAUSEA WITH VOMITING, UNSPECIFIED: ICD-10-CM

## 2025-05-14 DIAGNOSIS — R20.0 ANESTHESIA OF SKIN: ICD-10-CM

## 2025-05-14 DIAGNOSIS — Z87.11 PERSONAL HISTORY OF PEPTIC ULCER DISEASE: ICD-10-CM

## 2025-06-23 NOTE — ED PROVIDER NOTE - CONDITION AT DISCHARGE:
Anesthesiologist present for case.  See Anesthesia Record for details regarding sedation/anesthesia, medications, and vital signs. Improved

## 2025-07-03 NOTE — ED PROVIDER NOTE - MEDICAL DECISION MAKING DETAILS
weight-bearing as tolerated
pt c/o epigastric pain after eating breakfast - no n/v/d, no fevers/chills, min epigastric tend on exam w/o RUQ tend or guarding, likely gastritis, improved w/gi cocktail, diet modifications discussed at length, to take h2 blocker and f/u w/her gi, labs wnl, no indication for any emergent imaging at this time, pt understands and agrees w/plan